# Patient Record
Sex: MALE | Race: WHITE | NOT HISPANIC OR LATINO | Employment: OTHER | URBAN - METROPOLITAN AREA
[De-identification: names, ages, dates, MRNs, and addresses within clinical notes are randomized per-mention and may not be internally consistent; named-entity substitution may affect disease eponyms.]

---

## 2018-03-13 ENCOUNTER — OFFICE VISIT CONVERTED (OUTPATIENT)
Dept: FAMILY MEDICINE CLINIC | Facility: CLINIC | Age: 58
End: 2018-03-13
Attending: NURSE PRACTITIONER

## 2018-03-13 ENCOUNTER — CONVERSION ENCOUNTER (OUTPATIENT)
Dept: FAMILY MEDICINE CLINIC | Facility: CLINIC | Age: 58
End: 2018-03-13

## 2018-03-26 ENCOUNTER — OFFICE VISIT CONVERTED (OUTPATIENT)
Dept: FAMILY MEDICINE CLINIC | Facility: CLINIC | Age: 58
End: 2018-03-26
Attending: NURSE PRACTITIONER

## 2018-10-11 ENCOUNTER — OFFICE VISIT CONVERTED (OUTPATIENT)
Dept: CARDIOLOGY | Facility: CLINIC | Age: 58
End: 2018-10-11
Attending: NURSE PRACTITIONER

## 2019-10-10 ENCOUNTER — HOSPITAL ENCOUNTER (OUTPATIENT)
Dept: OTHER | Facility: HOSPITAL | Age: 59
Discharge: HOME | End: 2019-10-10
Attending: INTERNAL MEDICINE

## 2019-10-10 LAB
ALBUMIN SERPL-MCNC: 4.7 G/DL (ref 3.5–5)
ALBUMIN/GLOB SERPL: 1.5 {RATIO} (ref 1.4–2.6)
ALP SERPL-CCNC: 79 U/L (ref 56–119)
ALT SERPL-CCNC: 11 U/L (ref 10–40)
ANION GAP SERPL CALC-SCNC: 19 MMOL/L (ref 8–19)
AST SERPL-CCNC: 21 U/L (ref 15–50)
BILIRUB SERPL-MCNC: 0.34 MG/DL (ref 0.2–1.3)
BUN SERPL-MCNC: 17 MG/DL (ref 5–25)
BUN/CREAT SERPL: 13 {RATIO} (ref 6–20)
CALCIUM SERPL-MCNC: 9.8 MG/DL (ref 8.7–10.4)
CHLORIDE SERPL-SCNC: 100 MMOL/L (ref 99–111)
CHOLEST SERPL-MCNC: 186 MG/DL (ref 107–200)
CHOLEST/HDLC SERPL: 3.1 {RATIO} (ref 3–6)
CONV CO2: 26 MMOL/L (ref 22–32)
CONV TOTAL PROTEIN: 7.9 G/DL (ref 6.3–8.2)
CREAT UR-MCNC: 1.28 MG/DL (ref 0.7–1.2)
GFR SERPLBLD BASED ON 1.73 SQ M-ARVRAT: >60 ML/MIN/{1.73_M2}
GLOBULIN UR ELPH-MCNC: 3.2 G/DL (ref 2–3.5)
GLUCOSE SERPL-MCNC: 97 MG/DL (ref 70–99)
HDLC SERPL-MCNC: 60 MG/DL (ref 40–60)
LDLC SERPL CALC-MCNC: 114 MG/DL (ref 70–100)
OSMOLALITY SERPL CALC.SUM OF ELEC: 291 MOSM/KG (ref 273–304)
POTASSIUM SERPL-SCNC: 5.1 MMOL/L (ref 3.5–5.3)
SODIUM SERPL-SCNC: 140 MMOL/L (ref 135–147)
TRIGL SERPL-MCNC: 60 MG/DL (ref 40–150)
VLDLC SERPL-MCNC: 12 MG/DL (ref 5–37)

## 2019-10-14 ENCOUNTER — OFFICE VISIT CONVERTED (OUTPATIENT)
Dept: CARDIOLOGY | Facility: CLINIC | Age: 59
End: 2019-10-14
Attending: INTERNAL MEDICINE

## 2020-02-14 ENCOUNTER — HOSPITAL ENCOUNTER (OUTPATIENT)
Dept: OTHER | Facility: HOSPITAL | Age: 60
Discharge: HOME | End: 2020-02-14
Attending: INTERNAL MEDICINE

## 2020-02-14 LAB
ANION GAP SERPL CALC-SCNC: 17 MMOL/L (ref 8–19)
BUN SERPL-MCNC: 19 MG/DL (ref 5–25)
BUN/CREAT SERPL: 17 {RATIO} (ref 6–20)
CALCIUM SERPL-MCNC: 9.6 MG/DL (ref 8.7–10.4)
CHLORIDE SERPL-SCNC: 99 MMOL/L (ref 99–111)
CONV CO2: 27 MMOL/L (ref 22–32)
CREAT UR-MCNC: 1.14 MG/DL (ref 0.7–1.2)
GFR SERPLBLD BASED ON 1.73 SQ M-ARVRAT: >60 ML/MIN/{1.73_M2}
GLUCOSE SERPL-MCNC: 92 MG/DL (ref 70–99)
OSMOLALITY SERPL CALC.SUM OF ELEC: 288 MOSM/KG (ref 273–304)
POTASSIUM SERPL-SCNC: 4.7 MMOL/L (ref 3.5–5.3)
SODIUM SERPL-SCNC: 138 MMOL/L (ref 135–147)

## 2020-07-13 ENCOUNTER — HOSPITAL ENCOUNTER (OUTPATIENT)
Dept: OTHER | Facility: HOSPITAL | Age: 60
Discharge: HOME | End: 2020-07-13
Attending: INTERNAL MEDICINE

## 2020-07-13 LAB
ALBUMIN SERPL-MCNC: 4.3 G/DL (ref 3.5–5)
ALBUMIN/GLOB SERPL: 1.6 {RATIO} (ref 1.4–2.6)
ALP SERPL-CCNC: 79 U/L (ref 56–119)
ALT SERPL-CCNC: 12 U/L (ref 10–40)
ANION GAP SERPL CALC-SCNC: 15 MMOL/L (ref 8–19)
AST SERPL-CCNC: 20 U/L (ref 15–50)
BILIRUB SERPL-MCNC: 0.24 MG/DL (ref 0.2–1.3)
BUN SERPL-MCNC: 23 MG/DL (ref 5–25)
BUN/CREAT SERPL: 16 {RATIO} (ref 6–20)
CALCIUM SERPL-MCNC: 9.2 MG/DL (ref 8.7–10.4)
CHLORIDE SERPL-SCNC: 107 MMOL/L (ref 99–111)
CHOLEST SERPL-MCNC: 175 MG/DL (ref 107–200)
CHOLEST/HDLC SERPL: 3.1 {RATIO} (ref 3–6)
CONV CO2: 23 MMOL/L (ref 22–32)
CONV TOTAL PROTEIN: 7 G/DL (ref 6.3–8.2)
CREAT UR-MCNC: 1.44 MG/DL (ref 0.7–1.2)
GFR SERPLBLD BASED ON 1.73 SQ M-ARVRAT: 53 ML/MIN/{1.73_M2}
GLOBULIN UR ELPH-MCNC: 2.7 G/DL (ref 2–3.5)
GLUCOSE SERPL-MCNC: 93 MG/DL (ref 70–99)
HDLC SERPL-MCNC: 56 MG/DL (ref 40–60)
LDLC SERPL CALC-MCNC: 110 MG/DL (ref 70–100)
OSMOLALITY SERPL CALC.SUM OF ELEC: 293 MOSM/KG (ref 273–304)
POTASSIUM SERPL-SCNC: 5.2 MMOL/L (ref 3.5–5.3)
SODIUM SERPL-SCNC: 140 MMOL/L (ref 135–147)
T4 FREE SERPL-MCNC: 1.3 NG/DL (ref 0.9–1.8)
TRIGL SERPL-MCNC: 43 MG/DL (ref 40–150)
TSH SERPL-ACNC: 2.11 M[IU]/L (ref 0.27–4.2)
VLDLC SERPL-MCNC: 9 MG/DL (ref 5–37)

## 2020-07-15 ENCOUNTER — OFFICE VISIT CONVERTED (OUTPATIENT)
Dept: CARDIOLOGY | Facility: CLINIC | Age: 60
End: 2020-07-15
Attending: INTERNAL MEDICINE

## 2020-11-30 ENCOUNTER — TELEMEDICINE CONVERTED (OUTPATIENT)
Dept: FAMILY MEDICINE CLINIC | Facility: CLINIC | Age: 60
End: 2020-11-30
Attending: NURSE PRACTITIONER

## 2020-12-01 ENCOUNTER — HOSPITAL ENCOUNTER (OUTPATIENT)
Dept: FAMILY MEDICINE CLINIC | Facility: CLINIC | Age: 60
Discharge: HOME | End: 2020-12-01
Attending: NURSE PRACTITIONER

## 2020-12-03 LAB — SARS-COV-2 RNA SPEC QL NAA+PROBE: DETECTED

## 2021-01-27 ENCOUNTER — HOSPITAL ENCOUNTER (OUTPATIENT)
Dept: GENERAL RADIOLOGY | Facility: HOSPITAL | Age: 61
Discharge: HOME | End: 2021-01-27
Attending: INTERNAL MEDICINE

## 2021-01-27 LAB
ALBUMIN SERPL-MCNC: 4.5 G/DL (ref 3.5–5)
ALBUMIN/GLOB SERPL: 1.4 {RATIO} (ref 1.4–2.6)
ALP SERPL-CCNC: 91 U/L (ref 56–119)
ALT SERPL-CCNC: 18 U/L (ref 10–40)
ANION GAP SERPL CALC-SCNC: 19 MMOL/L (ref 8–19)
AST SERPL-CCNC: 25 U/L (ref 15–50)
BILIRUB SERPL-MCNC: 0.46 MG/DL (ref 0.2–1.3)
BUN SERPL-MCNC: 18 MG/DL (ref 5–25)
BUN/CREAT SERPL: 13 {RATIO} (ref 6–20)
CALCIUM SERPL-MCNC: 9.5 MG/DL (ref 8.7–10.4)
CHLORIDE SERPL-SCNC: 101 MMOL/L (ref 99–111)
CHOLEST SERPL-MCNC: 180 MG/DL (ref 107–200)
CHOLEST/HDLC SERPL: 2.8 {RATIO} (ref 3–6)
CONV CO2: 25 MMOL/L (ref 22–32)
CONV TOTAL PROTEIN: 7.8 G/DL (ref 6.3–8.2)
CREAT UR-MCNC: 1.34 MG/DL (ref 0.7–1.2)
GFR SERPLBLD BASED ON 1.73 SQ M-ARVRAT: 58 ML/MIN/{1.73_M2}
GLOBULIN UR ELPH-MCNC: 3.3 G/DL (ref 2–3.5)
GLUCOSE SERPL-MCNC: 91 MG/DL (ref 70–99)
HDLC SERPL-MCNC: 65 MG/DL (ref 40–60)
LDLC SERPL CALC-MCNC: 106 MG/DL (ref 70–100)
OSMOLALITY SERPL CALC.SUM OF ELEC: 291 MOSM/KG (ref 273–304)
POTASSIUM SERPL-SCNC: 4.6 MMOL/L (ref 3.5–5.3)
SODIUM SERPL-SCNC: 140 MMOL/L (ref 135–147)
TRIGL SERPL-MCNC: 45 MG/DL (ref 40–150)
VLDLC SERPL-MCNC: 9 MG/DL (ref 5–37)

## 2021-05-13 NOTE — PROGRESS NOTES
"   Progress Note      Patient Name: Yoandy Rush   Patient ID: [id number]   Sex: Male   YOB: 1960    Primary Care Provider: Lise EWING   Referring Provider: Lise EWING    Visit Date: November 30, 2020    Provider: KAYLIN Quintanilla   Location: [facility]   Location Address: [address] [place] [address]  [id number]   Location Phone: [phone]          Chief Complaint     Headache and body aches  Weak and sore       History Of Present Illness  TELEHEALTH TELEPHONE VISIT  Yoandy Rush is a 60 year old /White male who is presenting for evaluation via telehealth telephone visit. Verbal consent obtained before beginning visit.   Provider spent 11:36 minutes with patient during telehealth visit.   The following staff were present during this visit: pt, EMMANUEL, DEMARCUS Valdez LPN, brother Cliff   Past Medical History/Overview of Patient Symptoms  Yoandy Rush is a 60 year old /White male who presents for evaluation and treatment of:      He has been out on the tractor without any cover.  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  He has been around his brother's with covid.  He has had s/s started about 2-3 days ago.  No fever that noted.  NO coughing.  He feels warm to touch but \"I think I may have the flu\".  No SOA noted.  He said the EMS took Moe to ER.  He doesn't have a ride today.       Past Medical History  Disease Name Date Onset Notes   Arthritis --  --    Heart Disease --  --    Hemorrhoids --  --    Hypertension --  --    Rectal bleeding --  --    Renal Failure 05/29/14 Was seen in ER Dehydration         Past Surgical History  Procedure Name Date Notes   Colonoscopy 2015 --          Medication List  Name Date Started Instructions   lisinopril-hydrochlorothiazide 20-12.5 mg oral tablet 08/20/2020 ONE PO QD   metoprolol succinate 25 mg oral tablet extended release 24 hr 08/24/2020 TAKE ONE TABLET BY MOUTH TWICE DAILY "         Allergy List  Allergen Name Date Reaction Notes   NO KNOWN DRUG ALLERGIES --  --  --          Family Medical History  Disease Name Relative/Age Notes   Chronic Obstructive Pulmonary Disease Mother/   --    Heart Disease  --    Hyperlipidemia  --    Congestive Heart Failure Father/   --    Hypertension Mother/   --    - No Family History of Colorectal Cancer  --          Social History  Finding Status Start/Stop Quantity Notes   Agricultural worker --  --/-- --  farming   Alcohol Current some day --/-- 1-2 somedays 08/03/2017 - Beer   Caffeine Current - status unknown --/-- --  drinks coffee and soft drinks; 1-2 times per day   Other Substance Use Never --/-- --  --    Second hand smoke exposure Current some day --/-- --  yes   Tobacco Former [age] --  08/03/2017 - started smoking at age 22; quit smoking at age 32; smoked 10 cigarette(s) per day  use to as a child          Review of Systems  · Constitutional  o Admits  o : fatigue  o Denies  o : fever, weight loss, weight gain  · HENT  o Admits  o : headaches  o Denies  o : nasal congestion, postnasal drip, sore throat  · Cardiovascular  o Denies  o : lower extremity edema, claudication, chest pressure, palpitations  · Respiratory  o Denies  o : shortness of breath, wheezing, cough, hemoptysis, dyspnea on exertion  · Gastrointestinal  o Denies  o : nausea, vomiting, diarrhea, constipation, abdominal pain          Assessment  · Headache     784.0/R51  · Exposure to COVID-19 virus     V01.79/Z20.828  · Body aches     780.96/R52      Plan  · Orders  o [facility] NCOV2 (send-out) (23153) - V01.79/Z20.828 - 11/30/2020  o ACO-39: Current medications updated and reviewed (, 1159F) - - 11/30/2020  o Covid Testing at Non[facility] facility (46096) - - 11/30/2020  o Flu Screen A/B (76995) - - 11/30/2020  · Instructions  o Plan Of Care:   o Take all medications as prescribed/directed.  o Call the office with any concerns or questions.  o OTC Tylenol/IBU for the  aches. Drink plenty of fluids. To the ER--SOA and the ER.   o He will come tomorrow to get swabbed due to not being able to drive. Drink plenty of fluids. Call with concerns or questions  · Disposition  o Call or Return if symptoms worsen or persist.            Electronically Signed by: KAYLIN Quintanilla -Author on November 30, 2020 11:49:37 AM

## 2021-05-13 NOTE — PROGRESS NOTES
"   Progress Note      Patient Name: Yoandy Rush   Patient ID: [id number]   Sex: Male   YOB: 1960    Primary Care Provider: Lise EWING   Referring Provider: Lise EWING    Visit Date: July 15, 2020    Provider: Reji Mccloud MD   Location: [facility]   Location Address: [address] [place] [address], [facility]   [place]  [id number]   Location Phone: [phone]          Chief Complaint  · Hypertension   · Chronic kidney disease   · Mitral valve prolapse       History Of Present Illness  REFERRING PROVIDER: Lise EWING   Yoandy Rush is a 60-year-old gentleman with hypertension, chronic kidney disease, and mitral valve prolapse who is doing well. He denies chest pain, shortness of breath, PND or orthopnea, palpitations, dizziness, or syncope. He has not kept a log of his blood pressure lately but whenever he has checked them they have been normal according to him.   PAST MEDICAL HISTORY: Hypertension; mitral valve regurgitation; chronic kidney disease, stage 3.   FAMILY HISTORY: Positive for hypertension. Negative for diabetes and heart disease.   PSYCHOSOCIAL HISTORY: No history of mood change or depression. He rarely drinks alcohol. He does not use tobacco.   CURRENT MEDICATIONS: include Metoprolol ER 25 mg b.i.d.; Lisinopril/HCTZ 20/25 mg daily. The dosage and frequency of the medications were reviewed with the patient.       Review of Systems  · Cardiovascular  o Denies  o : palpitations (fast, fluttering, or skipping beats), swelling (feet, ankles, hands), shortness of breath while walking or lying flat, chest pain or angina pectoris   · Respiratory  o Denies  o : chronic or frequent cough, asthma or wheezing      Vitals  Date Time BP Position Site L\R Cuff Size HR RR TEMP (F) WT  HT  BMI kg/m2 BSA m2 O2 Sat HC       07/15/2020 08:40 /66 Sitting    52 - R   136lbs 0oz 5'  6\" 21.95 1.69           Physical " Examination  · Constitutional  o Appearance  o : Awake, alert, in no acute distress.  · Respiratory  o Respiratory  o : Clear to percussion and auscultation. Good respiratory effort.  · Cardiovascular  o Heart  o : PMI is not well felt. S2 is regular. No S3. No S4. 1-2/6 systolic murmur at the apex. Negative diastolic murmur.  o Peripheral Vascular System  o :   § Extremities  § : Good femoral and pedal pulses. No pedal edema.  · Gastrointestinal  o Abdominal Examination  o : Soft. No masses or tenderness felt. No hepatosplenomegaly. Abdominal aorta is not palpable.  · Labs  o Labs  o : Chemistry panel normal except for BUN 23, creatinine 1.44, GFR 53, normal electrolytes, HDL 56, , triglyceride 143. His LDL has gone up a little bit. Thyroid function was normal.          Assessment     1.  Hypertension, uncertain control.  He thinks it is good at home.   He will document it and bring it to us.   2.  Chronic kidney disease, stage3, stable.   3.  Mitral valve prolapse with mitral valve regurgitation, stable.   4.  Lipids not quite at goa but still dietary control would be optimal option.       Plan     1.  Discussed with him a proper diet. He eats only one meal a day which is not good for him.   2.  Home blood pressure monitoring for two weeks, and bring us the log now as well as two weeks before his        next appointment.   3.  Follow up in six months.      Reji Mccloud MD, [facility]  PM/pap    This note was transcribed by Wendi Tomas.  pap/pm  The above service was transcribed by Wendi Tomas, and I attest to the accuracy of the note.  PM             Electronically Signed by: Priya Tomas-, Other -Author on July 20, 2020 01:50:54 PM  Electronically Co-signed by: Reji Mccloud MD -Reviewer on July 29, 2020 04:05:27 PM

## 2021-05-15 VITALS
SYSTOLIC BLOOD PRESSURE: 146 MMHG | HEART RATE: 52 BPM | BODY MASS INDEX: 21.86 KG/M2 | WEIGHT: 136 LBS | DIASTOLIC BLOOD PRESSURE: 66 MMHG | HEIGHT: 66 IN

## 2021-05-15 VITALS
SYSTOLIC BLOOD PRESSURE: 140 MMHG | DIASTOLIC BLOOD PRESSURE: 76 MMHG | WEIGHT: 134 LBS | BODY MASS INDEX: 21.53 KG/M2 | HEIGHT: 66 IN | HEART RATE: 54 BPM

## 2021-05-16 VITALS
OXYGEN SATURATION: 100 % | HEIGHT: 67 IN | SYSTOLIC BLOOD PRESSURE: 138 MMHG | WEIGHT: 134.5 LBS | TEMPERATURE: 98.6 F | RESPIRATION RATE: 12 BRPM | HEART RATE: 59 BPM | DIASTOLIC BLOOD PRESSURE: 70 MMHG | BODY MASS INDEX: 21.11 KG/M2

## 2021-05-16 VITALS
TEMPERATURE: 98.6 F | DIASTOLIC BLOOD PRESSURE: 64 MMHG | WEIGHT: 134.25 LBS | HEIGHT: 67 IN | BODY MASS INDEX: 21.07 KG/M2 | HEART RATE: 61 BPM | RESPIRATION RATE: 12 BRPM | SYSTOLIC BLOOD PRESSURE: 138 MMHG | OXYGEN SATURATION: 100 %

## 2021-05-16 VITALS
DIASTOLIC BLOOD PRESSURE: 80 MMHG | HEART RATE: 56 BPM | SYSTOLIC BLOOD PRESSURE: 140 MMHG | HEIGHT: 68 IN | BODY MASS INDEX: 19.85 KG/M2 | WEIGHT: 131 LBS

## 2021-05-22 ENCOUNTER — TRANSCRIBE ORDERS (OUTPATIENT)
Dept: CARDIOLOGY | Facility: CLINIC | Age: 61
End: 2021-05-22

## 2021-05-22 DIAGNOSIS — I34.1 MVP (MITRAL VALVE PROLAPSE): Primary | ICD-10-CM

## 2021-08-24 RX ORDER — LISINOPRIL AND HYDROCHLOROTHIAZIDE 20; 12.5 MG/1; MG/1
TABLET ORAL
Qty: 30 TABLET | Refills: 1 | Status: SHIPPED | OUTPATIENT
Start: 2021-08-24 | End: 2021-10-11 | Stop reason: SDUPTHER

## 2021-08-24 NOTE — TELEPHONE ENCOUNTER
Patient was seen 7/15/2020. Medication was decreased 8/20/2020 due to bp log. Next OV 10/11/2021.

## 2021-10-10 PROBLEM — I10 HYPERTENSION: Status: ACTIVE | Noted: 2021-10-10

## 2021-10-10 PROBLEM — N18.31 STAGE 3A CHRONIC KIDNEY DISEASE: Chronic | Status: ACTIVE | Noted: 2021-10-10

## 2021-10-10 PROBLEM — I10 HYPERTENSION: Chronic | Status: ACTIVE | Noted: 2021-10-10

## 2021-10-10 PROBLEM — I34.1 MILD MITRAL VALVE PROLAPSE: Status: ACTIVE | Noted: 2021-10-10

## 2021-10-11 ENCOUNTER — OFFICE VISIT (OUTPATIENT)
Dept: CARDIOLOGY | Facility: CLINIC | Age: 61
End: 2021-10-11

## 2021-10-11 VITALS
WEIGHT: 134 LBS | DIASTOLIC BLOOD PRESSURE: 76 MMHG | BODY MASS INDEX: 21.53 KG/M2 | SYSTOLIC BLOOD PRESSURE: 134 MMHG | HEIGHT: 66 IN | HEART RATE: 48 BPM

## 2021-10-11 DIAGNOSIS — N18.31 STAGE 3A CHRONIC KIDNEY DISEASE (HCC): Chronic | ICD-10-CM

## 2021-10-11 DIAGNOSIS — I10 PRIMARY HYPERTENSION: Chronic | ICD-10-CM

## 2021-10-11 DIAGNOSIS — I34.1 MILD MITRAL VALVE PROLAPSE: Primary | Chronic | ICD-10-CM

## 2021-10-11 PROCEDURE — 99214 OFFICE O/P EST MOD 30 MIN: CPT | Performed by: INTERNAL MEDICINE

## 2021-10-11 RX ORDER — LISINOPRIL AND HYDROCHLOROTHIAZIDE 20; 12.5 MG/1; MG/1
1 TABLET ORAL DAILY
Qty: 90 TABLET | Refills: 3 | Status: SHIPPED | OUTPATIENT
Start: 2021-10-11 | End: 2022-07-11 | Stop reason: SDUPTHER

## 2021-10-11 RX ORDER — METOPROLOL SUCCINATE 25 MG/1
25 TABLET, EXTENDED RELEASE ORAL DAILY
COMMUNITY
End: 2021-10-11 | Stop reason: SDUPTHER

## 2021-10-11 RX ORDER — METOPROLOL SUCCINATE 25 MG/1
25 TABLET, EXTENDED RELEASE ORAL 2 TIMES DAILY
Qty: 180 TABLET | Refills: 3 | Status: SHIPPED | OUTPATIENT
Start: 2021-10-11 | End: 2022-07-11 | Stop reason: SDUPTHER

## 2021-10-11 NOTE — ASSESSMENT & PLAN NOTE
Renal condition has not been recently evaluated.  Patient had been asked to get labs done prior to his appointment today but forgot.  We will get them done in the next few days.   hypokalemia hypokalemia hypokalemia

## 2021-10-11 NOTE — PROGRESS NOTES
"Chief Complaint  Follow-up, Hypertension, and Chronic Kidney Disease    Subjective            Yoandy Rush presents to [facility]  Mr. Fitzgerald is a 61 years old gentleman with hypertension chronic kidney disease mitral valve prolapse who is done well.  He denies chest pain palpitation shortness of breath dizziness syncope his blood pressure is well controlled      Past Medical History:   Diagnosis Date   • Hypertension 10/10/2021   • Mild mitral valve prolapse 10/7/2015    With mild mitral valve regurgitation on echo 2015   • Stage 3a chronic kidney disease (HCC) 10/10/2021       No Known Allergies     History reviewed. No pertinent surgical history.     Social History     Tobacco Use   • Smoking status: Former Smoker   • Smokeless tobacco: Former User   Vaping Use   • Vaping Use: Never used   Substance Use Topics   • Alcohol use: Yes   • Drug use: Never       Family History   Family history unknown: Yes        Prior to Admission medications    Medication Sig Start Date End Date Taking? Authorizing Provider   lisinopril-hydrochlorothiazide (PRINZIDE,ZESTORETIC) 20-12.5 MG per tablet TAKE ONE TABLET BY MOUTH ONCE DAILY  Patient taking differently: Take 1 tablet by mouth Daily. 8/24/21  Yes Reji Mccloud MD   metoprolol succinate XL (TOPROL-XL) 25 MG 24 hr tablet Take 25 mg by mouth Daily.   Yes Provider, MD Chris        Review of Systems   Constitutional: Negative for fatigue.   Respiratory: Negative for cough and shortness of breath.    Cardiovascular: Negative for chest pain, palpitations and leg swelling.   Neurological: Negative for dizziness.        Objective     /76 (BP Location: Left arm, Patient Position: Sitting, Cuff Size: Adult)   Pulse (!) 48   Ht 167.6 cm (66\")   Wt 60.8 kg (134 lb)   BMI 21.63 kg/m²       Physical Exam  Constitutional:       General: He is awake.      Appearance: Normal appearance.   Neck:      Thyroid: No thyromegaly.      Vascular: No " carotid bruit or JVD.   Cardiovascular:      Rate and Rhythm: Normal rate and regular rhythm.      Chest Wall: PMI is not displaced.      Pulses: Normal pulses.      Heart sounds: Normal heart sounds, S1 normal and S2 normal. No murmur heard.  No friction rub. No gallop. No S3 or S4 sounds.    Pulmonary:      Effort: Pulmonary effort is normal.      Breath sounds: Normal breath sounds and air entry. No wheezing, rhonchi or rales.   Abdominal:      General: Bowel sounds are normal.      Palpations: Abdomen is soft. There is no mass.      Tenderness: There is no abdominal tenderness.   Musculoskeletal:      Cervical back: Neck supple.      Right lower leg: No edema.      Left lower leg: No edema.   Neurological:      Mental Status: He is alert and oriented to person, place, and time.   Psychiatric:         Mood and Affect: Mood normal.         Behavior: Behavior is cooperative.       No results found for: PROBNP, BNP  CMP    CMP 1/27/21   Glucose 91   BUN 18   Creatinine 1.34 (A)   Sodium 140   Potassium 4.6   Chloride 101   Calcium 9.5   Albumin 4.5   Total Bilirubin 0.46   Alkaline Phosphatase 91   AST (SGOT) 25   ALT (SGPT) 18   (A) Abnormal value               Lipid Panel    Lipid Panel 1/27/21   Total Cholesterol 180   Triglycerides 45   HDL Cholesterol 65 (A)   VLDL Cholesterol 9   LDL Cholesterol  106 (A)   (A) Abnormal value       Comments are available for some flowsheets but are not being displayed.            Lab Results   Component Value Date    TSH 2.110 07/13/2020      Lab Results   Component Value Date    FREET4 1.3 07/13/2020      No results found for: DDIMERQUANT  No results found for: MG   No results found for: DIGOXIN        Results for orders placed in visit on 10/11/21    Adult Transthoracic Echo Complete w/ Color, Spectral and Contrast if necessary per protocol    Interpretation Summary  · Estimated left ventricular EF was in agreement with the calculated left ventricular EF. Left ventricular  ejection fraction appears to be 56 - 60%. Left ventricular systolic function is normal.  · Left ventricular diastolic function was normal.  · The right ventricular cavity is borderline dilated.  · There is mild mitral valve prolapse of the posterior mitral leaflet.  · Estimated right ventricular systolic pressure from tricuspid regurgitation is normal (<35 mmHg).        Result Review :                           Assessment and Plan        Diagnoses and all orders for this visit:    1. Mild mitral valve prolapse (Primary)  -     CBC & Differential; Future  -     Comprehensive Metabolic Panel; Future  -     Lipid Panel; Future  -     Lipid Panel; Future  -     Comprehensive Metabolic Panel; Future  -     Magnesium; Future  -     TSH; Future    2. Primary hypertension  Assessment & Plan:  Hypertension is very well controlled on the current medications    Orders:  -     CBC & Differential; Future  -     Comprehensive Metabolic Panel; Future  -     Lipid Panel; Future  -     Lipid Panel; Future  -     Comprehensive Metabolic Panel; Future  -     Magnesium; Future  -     TSH; Future    3. Stage 3a chronic kidney disease (HCC)  Assessment & Plan:  Renal condition has not been recently evaluated.  Patient had been asked to get labs done prior to his appointment today but forgot.  We will get them done in the next few days.    Orders:  -     CBC & Differential; Future  -     Comprehensive Metabolic Panel; Future  -     Lipid Panel; Future  -     Lipid Panel; Future  -     Comprehensive Metabolic Panel; Future  -     Magnesium; Future  -     TSH; Future    Other orders  -     metoprolol succinate XL (TOPROL-XL) 25 MG 24 hr tablet; Take 1 tablet by mouth 2 (two) times a day.  Dispense: 180 tablet; Refill: 3  -     lisinopril-hydrochlorothiazide (PRINZIDE,ZESTORETIC) 20-12.5 MG per tablet; Take 1 tablet by mouth Daily.  Dispense: 90 tablet; Refill: 3          Follow Up     Return in about 9 months (around 7/11/2022) for With  June.    Patient was given instructions and counseling regarding his condition or for health maintenance advice. Please see specific information pulled into the AVS if appropriate.

## 2021-10-14 ENCOUNTER — LAB (OUTPATIENT)
Dept: LAB | Facility: HOSPITAL | Age: 61
End: 2021-10-14

## 2021-10-14 DIAGNOSIS — N18.31 STAGE 3A CHRONIC KIDNEY DISEASE (HCC): ICD-10-CM

## 2021-10-14 DIAGNOSIS — I34.1 MILD MITRAL VALVE PROLAPSE: ICD-10-CM

## 2021-10-14 DIAGNOSIS — I10 PRIMARY HYPERTENSION: ICD-10-CM

## 2021-10-14 LAB
ALBUMIN SERPL-MCNC: 4.7 G/DL (ref 3.5–5.2)
ALBUMIN/GLOB SERPL: 1.6 G/DL
ALP SERPL-CCNC: 79 U/L (ref 39–117)
ALT SERPL W P-5'-P-CCNC: 15 U/L (ref 1–41)
ANION GAP SERPL CALCULATED.3IONS-SCNC: 9.1 MMOL/L (ref 5–15)
AST SERPL-CCNC: 19 U/L (ref 1–40)
BASOPHILS # BLD AUTO: 0.07 10*3/MM3 (ref 0–0.2)
BASOPHILS NFR BLD AUTO: 1.1 % (ref 0–1.5)
BILIRUB SERPL-MCNC: 0.5 MG/DL (ref 0–1.2)
BUN SERPL-MCNC: 18 MG/DL (ref 6–20)
BUN/CREAT SERPL: 15.5 (ref 7–25)
CALCIUM SPEC-SCNC: 9.6 MG/DL (ref 8.6–10.5)
CHLORIDE SERPL-SCNC: 103 MMOL/L (ref 98–107)
CHOLEST SERPL-MCNC: 177 MG/DL (ref 0–200)
CO2 SERPL-SCNC: 28.9 MMOL/L (ref 22–29)
CREAT SERPL-MCNC: 1.16 MG/DL (ref 0.76–1.27)
DEPRECATED RDW RBC AUTO: 39.2 FL (ref 37–54)
EOSINOPHIL # BLD AUTO: 0.39 10*3/MM3 (ref 0–0.4)
EOSINOPHIL NFR BLD AUTO: 6.3 % (ref 0.3–6.2)
ERYTHROCYTE [DISTWIDTH] IN BLOOD BY AUTOMATED COUNT: 12.2 % (ref 12.3–15.4)
GFR SERPL CREATININE-BSD FRML MDRD: 64 ML/MIN/1.73
GLOBULIN UR ELPH-MCNC: 3 GM/DL
GLUCOSE SERPL-MCNC: 86 MG/DL (ref 65–99)
HCT VFR BLD AUTO: 33.7 % (ref 37.5–51)
HDLC SERPL-MCNC: 65 MG/DL (ref 40–60)
HGB BLD-MCNC: 11.6 G/DL (ref 13–17.7)
IMM GRANULOCYTES # BLD AUTO: 0.02 10*3/MM3 (ref 0–0.05)
IMM GRANULOCYTES NFR BLD AUTO: 0.3 % (ref 0–0.5)
LDLC SERPL CALC-MCNC: 104 MG/DL (ref 0–100)
LDLC/HDLC SERPL: 1.61 {RATIO}
LYMPHOCYTES # BLD AUTO: 1.32 10*3/MM3 (ref 0.7–3.1)
LYMPHOCYTES NFR BLD AUTO: 21.4 % (ref 19.6–45.3)
MCH RBC QN AUTO: 30.7 PG (ref 26.6–33)
MCHC RBC AUTO-ENTMCNC: 34.4 G/DL (ref 31.5–35.7)
MCV RBC AUTO: 89.2 FL (ref 79–97)
MONOCYTES # BLD AUTO: 0.66 10*3/MM3 (ref 0.1–0.9)
MONOCYTES NFR BLD AUTO: 10.7 % (ref 5–12)
NEUTROPHILS NFR BLD AUTO: 3.71 10*3/MM3 (ref 1.7–7)
NEUTROPHILS NFR BLD AUTO: 60.2 % (ref 42.7–76)
NRBC BLD AUTO-RTO: 0 /100 WBC (ref 0–0.2)
PLATELET # BLD AUTO: 266 10*3/MM3 (ref 140–450)
PMV BLD AUTO: 10.5 FL (ref 6–12)
POTASSIUM SERPL-SCNC: 4.6 MMOL/L (ref 3.5–5.2)
PROT SERPL-MCNC: 7.7 G/DL (ref 6–8.5)
RBC # BLD AUTO: 3.78 10*6/MM3 (ref 4.14–5.8)
SODIUM SERPL-SCNC: 141 MMOL/L (ref 136–145)
TRIGL SERPL-MCNC: 37 MG/DL (ref 0–150)
VLDLC SERPL-MCNC: 8 MG/DL (ref 5–40)
WBC # BLD AUTO: 6.17 10*3/MM3 (ref 3.4–10.8)

## 2021-10-14 PROCEDURE — 85025 COMPLETE CBC W/AUTO DIFF WBC: CPT

## 2021-10-14 PROCEDURE — 80053 COMPREHEN METABOLIC PANEL: CPT

## 2021-10-14 PROCEDURE — 36415 COLL VENOUS BLD VENIPUNCTURE: CPT

## 2021-10-14 PROCEDURE — 80061 LIPID PANEL: CPT

## 2021-10-28 ENCOUNTER — TELEPHONE (OUTPATIENT)
Dept: CARDIOLOGY | Facility: CLINIC | Age: 61
End: 2021-10-28

## 2021-10-28 NOTE — TELEPHONE ENCOUNTER
----- Message from Reji Mccloud MD sent at 10/26/2021  9:34 AM EDT -----  Let pt. Know labs look good

## 2022-07-05 ENCOUNTER — LAB (OUTPATIENT)
Dept: LAB | Facility: HOSPITAL | Age: 62
End: 2022-07-05

## 2022-07-05 DIAGNOSIS — I10 PRIMARY HYPERTENSION: Chronic | ICD-10-CM

## 2022-07-05 DIAGNOSIS — N18.31 STAGE 3A CHRONIC KIDNEY DISEASE: Chronic | ICD-10-CM

## 2022-07-05 DIAGNOSIS — I34.1 MILD MITRAL VALVE PROLAPSE: Chronic | ICD-10-CM

## 2022-07-05 LAB
ALBUMIN SERPL-MCNC: 4.6 G/DL (ref 3.5–5.2)
ALBUMIN/GLOB SERPL: 1.7 G/DL
ALP SERPL-CCNC: 80 U/L (ref 39–117)
ALT SERPL W P-5'-P-CCNC: 12 U/L (ref 1–41)
ANION GAP SERPL CALCULATED.3IONS-SCNC: 9.8 MMOL/L (ref 5–15)
AST SERPL-CCNC: 18 U/L (ref 1–40)
BILIRUB SERPL-MCNC: 0.5 MG/DL (ref 0–1.2)
BUN SERPL-MCNC: 22 MG/DL (ref 8–23)
BUN/CREAT SERPL: 18.8 (ref 7–25)
CALCIUM SPEC-SCNC: 9.3 MG/DL (ref 8.6–10.5)
CHLORIDE SERPL-SCNC: 102 MMOL/L (ref 98–107)
CHOLEST SERPL-MCNC: 173 MG/DL (ref 0–200)
CO2 SERPL-SCNC: 26.2 MMOL/L (ref 22–29)
CREAT SERPL-MCNC: 1.17 MG/DL (ref 0.76–1.27)
EGFRCR SERPLBLD CKD-EPI 2021: 71.4 ML/MIN/1.73
GLOBULIN UR ELPH-MCNC: 2.7 GM/DL
GLUCOSE SERPL-MCNC: 82 MG/DL (ref 65–99)
HDLC SERPL-MCNC: 62 MG/DL (ref 40–60)
LDLC SERPL CALC-MCNC: 103 MG/DL (ref 0–100)
LDLC/HDLC SERPL: 1.66 {RATIO}
MAGNESIUM SERPL-MCNC: 1.9 MG/DL (ref 1.6–2.4)
POTASSIUM SERPL-SCNC: 4.8 MMOL/L (ref 3.5–5.2)
PROT SERPL-MCNC: 7.3 G/DL (ref 6–8.5)
SODIUM SERPL-SCNC: 138 MMOL/L (ref 136–145)
TRIGL SERPL-MCNC: 40 MG/DL (ref 0–150)
TSH SERPL DL<=0.05 MIU/L-ACNC: 1.6 UIU/ML (ref 0.27–4.2)
VLDLC SERPL-MCNC: 8 MG/DL (ref 5–40)

## 2022-07-05 PROCEDURE — 83735 ASSAY OF MAGNESIUM: CPT

## 2022-07-05 PROCEDURE — 84443 ASSAY THYROID STIM HORMONE: CPT

## 2022-07-05 PROCEDURE — 80053 COMPREHEN METABOLIC PANEL: CPT

## 2022-07-05 PROCEDURE — 36415 COLL VENOUS BLD VENIPUNCTURE: CPT

## 2022-07-05 PROCEDURE — 80061 LIPID PANEL: CPT

## 2022-07-07 NOTE — PROGRESS NOTES
Chief Complaint  Hypertension    Subjective        Yoandy Rush presents to [facility]  He is a 62-year-old white male comes in to evaluate his mitral valve regurgitation and hypertension. Denies any chest pains, shortness of breath, palpitations, dizziness, syncope, swelling, PND, or orthopnea.  Cardiac wise he has no complaints.  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       Past History:    Past Medical History:   Diagnosis Date   • Hypertension 10/10/2021   • Mild mitral valve prolapse 10/7/2015    With mild mitral valve regurgitation on echo 2015   • Stage 3a chronic kidney disease (HCC) 10/10/2021        Family History: family history includes Heart disease in his father; Stroke in his mother.     Social History: reports that he quit smoking about 10 years ago. He has a 2.50 pack-year smoking history. He has quit using smokeless tobacco. He reports current alcohol use. He reports that he does not use drugs.    Allergies: Patient has no known allergies.    History reviewed. No pertinent surgical history.       Current Outpatient Medications:   •  lisinopril-hydrochlorothiazide (PRINZIDE,ZESTORETIC) 20-12.5 MG per tablet, Take 1 tablet by mouth Daily., Disp: 90 tablet, Rfl: 3  •  metoprolol succinate XL (TOPROL-XL) 25 MG 24 hr tablet, Take 1 tablet by mouth Daily., Disp: 180 tablet, Rfl: 3     Medications Discontinued During This Encounter   Medication Reason   • metoprolol succinate XL (TOPROL-XL) 25 MG 24 hr tablet Reorder   • lisinopril-hydrochlorothiazide (PRINZIDE,ZESTORETIC) 20-12.5 MG per tablet Reorder        Review of Systems   Constitutional: Negative for fatigue.   Respiratory: Negative for cough and shortness of breath.    Cardiovascular: Negative for chest pain, palpitations and leg swelling.   Neurological: Negative for dizziness and light-headedness.   All other systems reviewed and are negative.       Objective  "    Physical Exam  Constitutional:       General: He is not in acute distress.     Appearance: Normal appearance. He is normal weight.      Comments: Accompanied by his brother.   Neck:      Vascular: No carotid bruit.   Cardiovascular:      Rate and Rhythm: Normal rate and regular rhythm.      Chest Wall: PMI is displaced.      Heart sounds: Murmur (1/6 to 2/6 systolic murmur at the apex) heard.   Pulmonary:      Effort: Pulmonary effort is normal.      Breath sounds: Normal breath sounds.   Musculoskeletal:      Right lower leg: No edema.      Left lower leg: No edema.   Neurological:      Mental Status: He is alert.       /80   Pulse 50   Ht 167.6 cm (66\")   Wt 62.1 kg (136 lb 12.8 oz)   BMI 22.08 kg/m²       Vitals:    07/11/22 0952   BP: 126/80   Pulse: 50       Result Review :         The following data was reviewed by: KAYLIN Jefferson on 07/11/2022:      CMP    CMP 10/14/21 7/5/22   Glucose 86 82   BUN 18 22   Creatinine 1.16 1.17   eGFR Non African Am 64    Sodium 141 138   Potassium 4.6 4.8   Chloride 103 102   Calcium 9.6 9.3   Albumin 4.70 4.60   Total Bilirubin 0.5 0.5   Alkaline Phosphatase 79 80   AST (SGOT) 19 18   ALT (SGPT) 15 12           CBC w/diff    CBC w/Diff 10/14/21   WBC 6.17   RBC 3.78 (A)   Hemoglobin 11.6 (A)   Hematocrit 33.7 (A)   MCV 89.2   MCH 30.7   MCHC 34.4   RDW 12.2 (A)   Platelets 266   Neutrophil Rel % 60.2   Immature Granulocyte Rel % 0.3   Lymphocyte Rel % 21.4   Monocyte Rel % 10.7   Eosinophil Rel % 6.3 (A)   Basophil Rel % 1.1   (A) Abnormal value             Lipid Panel    Lipid Panel 10/14/21 7/5/22   Total Cholesterol 177 173   Triglycerides 37 40   HDL Cholesterol 65 (A) 62 (A)   VLDL Cholesterol 8 8   LDL Cholesterol  104 (A) 103 (A)   LDL/HDL Ratio 1.61 1.66   (A) Abnormal value             Lab Results   Component Value Date    TSH 1.600 07/05/2022    TSH 2.110 07/13/2020      Lab Results   Component Value Date    FREET4 1.3 07/13/2020      Magnesium "   Date Value Ref Range Status   07/05/2022 1.9 1.6 - 2.4 mg/dL Final         Last Echo  Results for orders placed in visit on 10/11/21    Adult Transthoracic Echo Complete w/ Color, Spectral and Contrast if necessary per protocol    Interpretation Summary  · Estimated left ventricular EF was in agreement with the calculated left ventricular EF. Left ventricular ejection fraction appears to be 56 - 60%. Left ventricular systolic function is normal.  · Left ventricular diastolic function was normal.  · The right ventricular cavity is borderline dilated.  · There is mild mitral valve prolapse of the posterior mitral leaflet.  · Estimated right ventricular systolic pressure from tricuspid regurgitation is normal (<35 mmHg).             Assessment and Plan    Diagnoses and all orders for this visit:    1. Primary hypertension (Primary)  Assessment & Plan:  Controlled.  Continue metoprolol ER 25 mg and lisinopril HCT 20/12.5.    Orders:  -     metoprolol succinate XL (TOPROL-XL) 25 MG 24 hr tablet; Take 1 tablet by mouth Daily.  Dispense: 180 tablet; Refill: 3  -     lisinopril-hydrochlorothiazide (PRINZIDE,ZESTORETIC) 20-12.5 MG per tablet; Take 1 tablet by mouth Daily.  Dispense: 90 tablet; Refill: 3    2. Mild mitral valve prolapse  Assessment & Plan:  Without shortness of breath.  We will continue to monitor.            Follow Up     Return in about 9 months (around 4/11/2023) for with [facility] per request.    Patient was given instructions and counseling regarding his condition or for health maintenance advice. Please see specific information pulled into the AVS if appropriate.       KAYLIN Mcallister  07/11/22 07:58 EDT

## 2022-07-11 ENCOUNTER — OFFICE VISIT (OUTPATIENT)
Dept: CARDIOLOGY | Facility: CLINIC | Age: 62
End: 2022-07-11

## 2022-07-11 VITALS
HEIGHT: 66 IN | DIASTOLIC BLOOD PRESSURE: 80 MMHG | BODY MASS INDEX: 21.98 KG/M2 | WEIGHT: 136.8 LBS | SYSTOLIC BLOOD PRESSURE: 126 MMHG | HEART RATE: 50 BPM

## 2022-07-11 DIAGNOSIS — I34.1 MILD MITRAL VALVE PROLAPSE: Chronic | ICD-10-CM

## 2022-07-11 DIAGNOSIS — I10 PRIMARY HYPERTENSION: Primary | Chronic | ICD-10-CM

## 2022-07-11 PROCEDURE — 99214 OFFICE O/P EST MOD 30 MIN: CPT | Performed by: NURSE PRACTITIONER

## 2022-07-11 RX ORDER — METOPROLOL SUCCINATE 25 MG/1
25 TABLET, EXTENDED RELEASE ORAL DAILY
Qty: 180 TABLET | Refills: 3 | Status: SHIPPED | OUTPATIENT
Start: 2022-07-11

## 2022-07-11 RX ORDER — LISINOPRIL AND HYDROCHLOROTHIAZIDE 20; 12.5 MG/1; MG/1
1 TABLET ORAL DAILY
Qty: 90 TABLET | Refills: 3 | Status: SHIPPED | OUTPATIENT
Start: 2022-07-11

## 2023-04-17 ENCOUNTER — OFFICE VISIT (OUTPATIENT)
Dept: CARDIOLOGY | Facility: CLINIC | Age: 63
End: 2023-04-17
Payer: COMMERCIAL

## 2023-04-17 VITALS
SYSTOLIC BLOOD PRESSURE: 132 MMHG | HEIGHT: 66 IN | HEART RATE: 53 BPM | DIASTOLIC BLOOD PRESSURE: 40 MMHG | BODY MASS INDEX: 21.58 KG/M2 | WEIGHT: 134.3 LBS

## 2023-04-17 DIAGNOSIS — I10 PRIMARY HYPERTENSION: Chronic | ICD-10-CM

## 2023-04-17 DIAGNOSIS — I34.1 MILD MITRAL VALVE PROLAPSE: Primary | Chronic | ICD-10-CM

## 2023-04-17 PROCEDURE — 99214 OFFICE O/P EST MOD 30 MIN: CPT | Performed by: INTERNAL MEDICINE

## 2023-04-17 PROCEDURE — 3075F SYST BP GE 130 - 139MM HG: CPT | Performed by: INTERNAL MEDICINE

## 2023-04-17 PROCEDURE — 3078F DIAST BP <80 MM HG: CPT | Performed by: INTERNAL MEDICINE

## 2023-04-17 RX ORDER — METOPROLOL SUCCINATE 25 MG/1
25 TABLET, EXTENDED RELEASE ORAL DAILY
Qty: 180 TABLET | Refills: 3 | Status: SHIPPED | OUTPATIENT
Start: 2023-04-17 | End: 2023-04-19 | Stop reason: SDUPTHER

## 2023-04-17 RX ORDER — LISINOPRIL AND HYDROCHLOROTHIAZIDE 20; 12.5 MG/1; MG/1
1 TABLET ORAL DAILY
Qty: 90 TABLET | Refills: 3 | Status: SHIPPED | OUTPATIENT
Start: 2023-04-17

## 2023-04-17 NOTE — PROGRESS NOTES
"Chief Complaint  Hypertension, Mitral Valve Prolapse, and Follow-up    Subjective    Patient with no new complaints or problems stable breathing capacity no chest pain.  Blood pressure has been controlled  Past Medical History:   Diagnosis Date   • Hypertension 10/10/2021   • Mild mitral valve prolapse 10/7/2015    With mild mitral valve regurgitation on echo    • Stage 3a chronic kidney disease 10/10/2021         Current Outpatient Medications:   •  lisinopril-hydrochlorothiazide (PRINZIDE,ZESTORETIC) 20-12.5 MG per tablet, Take 1 tablet by mouth Daily., Disp: 90 tablet, Rfl: 3  •  metoprolol succinate XL (TOPROL-XL) 25 MG 24 hr tablet, Take 1 tablet by mouth Daily., Disp: 180 tablet, Rfl: 3    Medications Discontinued During This Encounter   Medication Reason   • metoprolol succinate XL (TOPROL-XL) 25 MG 24 hr tablet Reorder   • lisinopril-hydrochlorothiazide (PRINZIDE,ZESTORETIC) 20-12.5 MG per tablet Reorder     No Known Allergies     Social History     Tobacco Use   • Smoking status: Former     Packs/day: 0.50     Years: 5.00     Pack years: 2.50     Types: Cigarettes     Quit date:      Years since quittin.2   • Smokeless tobacco: Former   Vaping Use   • Vaping Use: Never used   Substance Use Topics   • Alcohol use: Yes     Comment: occ   • Drug use: Never       Family History   Problem Relation Age of Onset   • Stroke Mother    • Heart disease Father         Objective     /40   Pulse 53   Ht 167.6 cm (66\")   Wt 60.9 kg (134 lb 4.8 oz)   BMI 21.68 kg/m²       Physical Exam    General Appearance:   · no acute distress  · Alert and oriented x3  HENT:   · lips not cyanotic  · Atraumatic  Neck:  · No jvd   · supple  Respiratory:  · no respiratory distress  · normal breath sounds  · no rales  Cardiovascular:  · Regular rate and rhythm  · no S3, no S4   · no murmur  · no rub  Extremities  · No cyanosis  · lower extremity edema: none    Skin:   · warm, dry  · No rashes      Result Review : "     No results found for: PROBNP  CMP        7/5/2022    10:42   CMP   Glucose 82     BUN 22     Creatinine 1.17     EGFR 71.4     Sodium 138     Potassium 4.8     Chloride 102     Calcium 9.3     Total Protein 7.3     Albumin 4.60     Globulin 2.7     Total Bilirubin 0.5     Alkaline Phosphatase 80     AST (SGOT) 18     ALT (SGPT) 12     Albumin/Globulin Ratio 1.7     BUN/Creatinine Ratio 18.8     Anion Gap 9.8          Lab Results   Component Value Date    TSH 1.600 07/05/2022      Lab Results   Component Value Date    FREET4 1.3 07/13/2020      No results found for: DDIMERQUANT  Magnesium   Date Value Ref Range Status   07/05/2022 1.9 1.6 - 2.4 mg/dL Final      No results found for: DIGOXIN   No results found for: TROPONINT        Lipid Panel        7/5/2022    10:42   Lipid Panel   Total Cholesterol 173     Triglycerides 40     HDL Cholesterol 62     VLDL Cholesterol 8     LDL Cholesterol  103     LDL/HDL Ratio 1.66       No results found for: POCTROP    Results for orders placed in visit on 10/11/21    Adult Transthoracic Echo Complete w/ Color, Spectral and Contrast if necessary per protocol    Interpretation Summary  · Estimated left ventricular EF was in agreement with the calculated left ventricular EF. Left ventricular ejection fraction appears to be 56 - 60%. Left ventricular systolic function is normal.  · Left ventricular diastolic function was normal.  · The right ventricular cavity is borderline dilated.  · There is mild mitral valve prolapse of the posterior mitral leaflet.  · Estimated right ventricular systolic pressure from tricuspid regurgitation is normal (<35 mmHg).                 Diagnoses and all orders for this visit:    1. Mild mitral valve prolapse (Primary)    2. Primary hypertension  Assessment & Plan:  Continue with lisinopril hydrochlorothiazide 20/12.5 mg daily and Toprol 25 daily dosing    Orders:  -     lisinopril-hydrochlorothiazide (PRINZIDE,ZESTORETIC) 20-12.5 MG per tablet;  Take 1 tablet by mouth Daily.  Dispense: 90 tablet; Refill: 3  -     metoprolol succinate XL (TOPROL-XL) 25 MG 24 hr tablet; Take 1 tablet by mouth Daily.  Dispense: 180 tablet; Refill: 3  -     Basic Metabolic Panel; Future          Follow Up     Return in about 1 year (around 4/17/2024) for Follow with Tonja Hawk.          Patient was given instructions and counseling regarding his condition or for health maintenance advice. Please see specific information pulled into the AVS if appropriate.

## 2023-04-19 ENCOUNTER — TELEPHONE (OUTPATIENT)
Dept: CARDIOLOGY | Facility: CLINIC | Age: 63
End: 2023-04-19
Payer: COMMERCIAL

## 2023-04-19 DIAGNOSIS — I10 PRIMARY HYPERTENSION: Chronic | ICD-10-CM

## 2023-04-19 RX ORDER — METOPROLOL SUCCINATE 25 MG/1
25 TABLET, EXTENDED RELEASE ORAL DAILY
Qty: 90 TABLET | Refills: 3 | Status: SHIPPED | OUTPATIENT
Start: 2023-04-19

## 2023-04-19 NOTE — TELEPHONE ENCOUNTER
Pharmacy Name:  [facility]    Pharmacy representative name: CHAS    Pharmacy representative phone number: [phone]    What medication are you calling in regards to: METOPROLOL    What question does the pharmacy have: NEEDING CLARIFICATION ON QUANTITY    Who is the provider that prescribed the medication:

## 2023-08-11 ENCOUNTER — OFFICE VISIT (OUTPATIENT)
Dept: FAMILY MEDICINE CLINIC | Facility: CLINIC | Age: 63
End: 2023-08-11
Payer: COMMERCIAL

## 2023-08-11 VITALS
HEART RATE: 59 BPM | SYSTOLIC BLOOD PRESSURE: 131 MMHG | BODY MASS INDEX: 20.62 KG/M2 | WEIGHT: 128.3 LBS | OXYGEN SATURATION: 98 % | DIASTOLIC BLOOD PRESSURE: 67 MMHG | TEMPERATURE: 97.8 F | HEIGHT: 66 IN

## 2023-08-11 DIAGNOSIS — K21.9 GASTROESOPHAGEAL REFLUX DISEASE, UNSPECIFIED WHETHER ESOPHAGITIS PRESENT: ICD-10-CM

## 2023-08-11 DIAGNOSIS — R10.11 RIGHT UPPER QUADRANT PAIN: Primary | ICD-10-CM

## 2023-08-11 PROCEDURE — 1159F MED LIST DOCD IN RCRD: CPT | Performed by: NURSE PRACTITIONER

## 2023-08-11 PROCEDURE — 3075F SYST BP GE 130 - 139MM HG: CPT | Performed by: NURSE PRACTITIONER

## 2023-08-11 PROCEDURE — 1160F RVW MEDS BY RX/DR IN RCRD: CPT | Performed by: NURSE PRACTITIONER

## 2023-08-11 PROCEDURE — 3078F DIAST BP <80 MM HG: CPT | Performed by: NURSE PRACTITIONER

## 2023-08-11 PROCEDURE — 99213 OFFICE O/P EST LOW 20 MIN: CPT | Performed by: NURSE PRACTITIONER

## 2023-08-11 RX ORDER — OMEPRAZOLE 40 MG/1
40 CAPSULE, DELAYED RELEASE ORAL DAILY
Qty: 14 CAPSULE | Refills: 0 | Status: SHIPPED | OUTPATIENT
Start: 2023-08-11 | End: 2023-08-25

## 2023-08-11 NOTE — PROGRESS NOTES
"Chief Complaint  Back Pain and side pain (Right side/)    Subjective          Yoandy Rush, 63 y.o. male presents to [facility]  History of Present Illness   Patient presents today for an acute visit.  He is accompanied by his brother.  He is a patient KAYLIN Quintanilla. He is complaining of pain in the mid to right upper quadrant that radiates to his right back.  He states this has been going on for about 2 days.  He states that he took some Gas-X last night and that seemed to have relieved his symptoms.  He states it was worse when he was laying down.  He denies any known injuries.  He does still have his gallbladder.    PHQ-2 Depression Screening  Little interest or pleasure in doing things? 0-->not at all   Feeling down, depressed, or hopeless? 0-->not at all   PHQ-2 Total Score 0       Tobacco Use: Medium Risk    Smoking Tobacco Use: Former    Smokeless Tobacco Use: Former    Passive Exposure: Not on file      Objective   Vital Signs:   /67   Pulse 59   Temp 97.8 øF (36.6 øC)   Ht 167.6 cm (66\")   Wt 58.2 kg (128 lb 4.8 oz)   SpO2 98%   BMI 20.71 kg/mý       Current Outpatient Medications:     lisinopril-hydrochlorothiazide (PRINZIDE,ZESTORETIC) 20-12.5 MG per tablet, Take 1 tablet by mouth Daily., Disp: 90 tablet, Rfl: 3    metoprolol succinate XL (TOPROL-XL) 25 MG 24 hr tablet, Take 1 tablet by mouth Daily., Disp: 90 tablet, Rfl: 3    omeprazole (priLOSEC) 40 MG capsule, Take 1 capsule by mouth Daily for 14 days., Disp: 14 capsule, Rfl: 0   Past Medical History:   Diagnosis Date    Hypertension 10/10/2021    Mild mitral valve prolapse 10/7/2015    With mild mitral valve regurgitation on echo 2015    Stage 3a chronic kidney disease 10/10/2021      Physical Exam  Vitals reviewed.   Constitutional:       Appearance: Normal appearance. He is well-developed.   Neck:      Thyroid: No thyroid mass, thyromegaly or thyroid tenderness.   Cardiovascular:      " Rate and Rhythm: Normal rate and regular rhythm.      Heart sounds: No murmur heard.    No friction rub. No gallop.   Pulmonary:      Effort: Pulmonary effort is normal.      Breath sounds: Normal breath sounds. No wheezing or rhonchi.   Abdominal:      General: Bowel sounds are normal.      Palpations: Abdomen is soft.      Tenderness: There is no abdominal tenderness.   Lymphadenopathy:      Cervical: No cervical adenopathy.   Skin:     General: Skin is warm and dry.      Findings: No rash.   Neurological:      Mental Status: He is alert and oriented to person, place, and time.      Cranial Nerves: No cranial nerve deficit.   Psychiatric:         Mood and Affect: Mood and affect normal.         Behavior: Behavior normal.         Thought Content: Thought content normal. Thought content does not include homicidal or suicidal ideation.         Judgment: Judgment normal.      Result Review :   {The following data was reviewed by KAYLIN Mccarthy    No Images in the past 120 days found..    Common Labs   Common labs          7/18/2023    10:30   Common Labs   Glucose 87    BUN 17    Creatinine 1.21    Sodium 141    Potassium 5.1    Chloride 105    Calcium 9.6             Assessment and Plan    Diagnoses and all orders for this visit:    1. Right upper quadrant pain (Primary)    2. Gastroesophageal reflux disease, unspecified whether esophagitis present  -     omeprazole (priLOSEC) 40 MG capsule; Take 1 capsule by mouth Daily for 14 days.  Dispense: 14 capsule; Refill: 0    I will start him on Prilosec 40 mg daily for 14 days.  I advised him on GERD symptoms and will provide him diet information.  Advised him not to lay down for at least 2 hours after eating.  I will have him follow-up with me in 2 weeks to see how he is doing.  Advised if he is not doing better, we will do further work-up.    Follow Up   Return in about 2 weeks (around 8/25/2023) for Recheck GERD, RUQ pain.  Patient was given instructions and  counseling regarding his condition or for health maintenance advice. Please see specific information pulled into the AVS if appropriate.     Parts of this note are electronic transcriptions/translations of spoken language to printed text using the Dragon Dictation system.      Liliam Batres, KAYLIN  08/11/2023

## 2023-08-28 ENCOUNTER — OFFICE VISIT (OUTPATIENT)
Dept: FAMILY MEDICINE CLINIC | Facility: CLINIC | Age: 63
End: 2023-08-28
Payer: COMMERCIAL

## 2023-08-28 VITALS
HEART RATE: 57 BPM | WEIGHT: 132.7 LBS | DIASTOLIC BLOOD PRESSURE: 66 MMHG | SYSTOLIC BLOOD PRESSURE: 134 MMHG | TEMPERATURE: 97.4 F | BODY MASS INDEX: 21.33 KG/M2 | HEIGHT: 66 IN | OXYGEN SATURATION: 99 %

## 2023-08-28 DIAGNOSIS — Z23 NEED FOR FIRST BOOSTER DOSE OF COVID-19 VACCINE: ICD-10-CM

## 2023-08-28 DIAGNOSIS — K21.9 GASTROESOPHAGEAL REFLUX DISEASE, UNSPECIFIED WHETHER ESOPHAGITIS PRESENT: Primary | ICD-10-CM

## 2023-08-28 DIAGNOSIS — M54.2 NECK PAIN: ICD-10-CM

## 2023-08-28 DIAGNOSIS — M79.601 PAIN OF RIGHT UPPER EXTREMITY: ICD-10-CM

## 2023-08-28 PROBLEM — I51.9 HEART DISEASE: Status: ACTIVE | Noted: 2023-08-28

## 2023-08-28 PROBLEM — M19.90 ARTHRITIS: Status: ACTIVE | Noted: 2023-08-28

## 2023-08-28 PROBLEM — K62.5 RECTAL BLEEDING: Status: ACTIVE | Noted: 2023-08-28

## 2023-08-28 PROBLEM — K64.9 HEMORRHOIDS: Status: ACTIVE | Noted: 2023-08-28

## 2023-08-28 RX ORDER — FAMOTIDINE 40 MG/1
40 TABLET, FILM COATED ORAL NIGHTLY
Qty: 30 TABLET | Refills: 0 | Status: SHIPPED | OUTPATIENT
Start: 2023-08-28

## 2023-08-28 NOTE — ASSESSMENT & PLAN NOTE
GERD has improved after he finished 2 weeks of Prilosec.  He is still having some acid reflux.  I will start him on Pepcid 40 mg at night.  I also advised him on foods to avoid.  Handout on GERD provided, see AVS.

## 2023-08-28 NOTE — PROGRESS NOTES
"Chief Complaint  Heartburn    Subjective          Yoandy Rush, 63 y.o. male presents to [facility]  History of Present Illness   Patient presents today for 2-week follow-up on GERD and right upper quadrant pain.  I started him on Prilosec 40 mg for 2 weeks.  He states that his stomach does feel better but he does still sometimes have some acid reflux but thinks it may be due to some of the foods that he has been eating.    He is now complaining of right posterior shoulder pain, neck pain and sometimes down his arm.  He has not tried any over-the-counter medicine.  He states he has some Aleve at home and could try that.      Tobacco Use: Medium Risk    Smoking Tobacco Use: Former    Smokeless Tobacco Use: Former    Passive Exposure: Not on file      Objective   Vital Signs:   /66 (BP Location: Left arm, Patient Position: Sitting, Cuff Size: Adult)   Pulse 57   Temp 97.4 øF (36.3 øC)   Ht 167.6 cm (66\")   Wt 60.2 kg (132 lb 11.2 oz)   SpO2 99%   BMI 21.42 kg/mý       Current Outpatient Medications:     lisinopril-hydrochlorothiazide (PRINZIDE,ZESTORETIC) 20-12.5 MG per tablet, Take 1 tablet by mouth Daily., Disp: 90 tablet, Rfl: 3    metoprolol succinate XL (TOPROL-XL) 25 MG 24 hr tablet, Take 1 tablet by mouth Daily., Disp: 90 tablet, Rfl: 3    famotidine (Pepcid) 40 MG tablet, Take 1 tablet by mouth Every Night., Disp: 30 tablet, Rfl: 0   Past Medical History:   Diagnosis Date    Hypertension 10/10/2021    Mild mitral valve prolapse 10/7/2015    With mild mitral valve regurgitation on echo 2015    Stage 3a chronic kidney disease 10/10/2021      Physical Exam  Vitals reviewed.   Constitutional:       Appearance: Normal appearance. He is well-developed.   Neck:      Thyroid: No thyroid mass, thyromegaly or thyroid tenderness.   Cardiovascular:      Rate and Rhythm: Normal rate and regular rhythm.      Heart sounds: No murmur heard.    No friction rub. No gallop. "   Pulmonary:      Effort: Pulmonary effort is normal.      Breath sounds: Normal breath sounds. No wheezing or rhonchi.   Abdominal:      General: Bowel sounds are normal.      Palpations: Abdomen is soft.      Tenderness: There is no abdominal tenderness.   Musculoskeletal:      Right shoulder: No swelling or tenderness.      Cervical back: No spasms or tenderness.   Lymphadenopathy:      Cervical: No cervical adenopathy.   Skin:     General: Skin is warm and dry.   Neurological:      Mental Status: He is alert and oriented to person, place, and time.      Cranial Nerves: No cranial nerve deficit.   Psychiatric:         Mood and Affect: Mood and affect normal.         Behavior: Behavior normal.         Thought Content: Thought content normal. Thought content does not include homicidal or suicidal ideation.         Judgment: Judgment normal.             Assessment and Plan    Diagnoses and all orders for this visit:    1. Gastroesophageal reflux disease, unspecified whether esophagitis present (Primary)  Assessment & Plan:  GERD has improved after he finished 2 weeks of Prilosec.  He is still having some acid reflux.  I will start him on Pepcid 40 mg at night.  I also advised him on foods to avoid.  Handout on GERD provided, see AVS.    Orders:  -     famotidine (Pepcid) 40 MG tablet; Take 1 tablet by mouth Every Night.  Dispense: 30 tablet; Refill: 0    2. Neck pain  Assessment & Plan:  Recommended that he try Aleve for both his neck and shoulder/arm pain but advised him to make sure he takes it with food.  We also discussed changing his pillow.  Advised to follow-up if he does not get some improvement.      3. Pain of right upper extremity    4. Need for first booster dose of COVID-19 vaccine  -     COVID-19 (Pfizer) Bivalent 12+yrs        Follow Up   Return in about 2 weeks (around 9/11/2023) for Annual physical for Lise.  Patient was given instructions and counseling regarding his condition or for health  maintenance advice. Please see specific information pulled into the AVS if appropriate.     Parts of this note are electronic transcriptions/translations of spoken language to printed text using the Dragon Dictation system.      Liliam Batres, KAYLIN  08/28/2023

## 2023-08-28 NOTE — ASSESSMENT & PLAN NOTE
Recommended that he try Aleve for both his neck and shoulder/arm pain but advised him to make sure he takes it with food.  We also discussed changing his pillow.  Advised to follow-up if he does not get some improvement.

## 2023-09-11 ENCOUNTER — OFFICE VISIT (OUTPATIENT)
Dept: FAMILY MEDICINE CLINIC | Facility: CLINIC | Age: 63
End: 2023-09-11
Payer: COMMERCIAL

## 2023-09-11 VITALS
HEART RATE: 55 BPM | SYSTOLIC BLOOD PRESSURE: 100 MMHG | HEIGHT: 66 IN | WEIGHT: 131 LBS | DIASTOLIC BLOOD PRESSURE: 50 MMHG | TEMPERATURE: 97.7 F | RESPIRATION RATE: 16 BRPM | BODY MASS INDEX: 21.05 KG/M2 | OXYGEN SATURATION: 100 %

## 2023-09-11 DIAGNOSIS — Z23 NEED FOR TDAP VACCINATION: ICD-10-CM

## 2023-09-11 DIAGNOSIS — Z12.5 SCREENING PSA (PROSTATE SPECIFIC ANTIGEN): ICD-10-CM

## 2023-09-11 DIAGNOSIS — M25.511 ACUTE PAIN OF RIGHT SHOULDER: Primary | ICD-10-CM

## 2023-09-11 DIAGNOSIS — Z11.59 ENCOUNTER FOR HEPATITIS C SCREENING TEST FOR LOW RISK PATIENT: ICD-10-CM

## 2023-09-11 LAB
HCV AB SER DONR QL: NORMAL
PSA SERPL-MCNC: 1.7 NG/ML (ref 0–4)

## 2023-09-11 PROCEDURE — G0103 PSA SCREENING: HCPCS | Performed by: NURSE PRACTITIONER

## 2023-09-11 PROCEDURE — 86803 HEPATITIS C AB TEST: CPT | Performed by: NURSE PRACTITIONER

## 2023-09-11 NOTE — PROGRESS NOTES
"Chief Complaint  Arm Pain (Right arm pain, states feels like moves around), Shoulder Pain (Right arm states feels like it is arthritis), and Follow-up (States saw Liliam 2 weeks ago)    Subjective          Yoandy Rush presents to [facility]  History of Present Illness  His stomach is about the same but he is having the pain in the right shoulder.  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  He is able to move that he just has some stiffness.  He said the actual pain is gotten better since seeing Liliam.  He would be willing to get the x-rays just to look at the potential for arthritis.  He is followed by cardiology for his routine meds.    Depression: Not at risk    PHQ-2 Score: 0    and 2023    BMI is within normal parameters. No other follow-up for BMI required.         Allergies  Patient has no known allergies.    Social History     Tobacco Use    Smoking status: Former     Packs/day: 0.50     Years: 5.00     Pack years: 2.50     Types: Cigarettes     Quit date:      Years since quittin.7    Smokeless tobacco: Former   Vaping Use    Vaping Use: Never used   Substance Use Topics    Alcohol use: Yes     Alcohol/week: 2.0 standard drinks     Types: 2 Cans of beer per week     Comment: 1-2 per day    Drug use: Never       Family History   Problem Relation Age of Onset    Stroke Mother     Heart disease Father         Health Maintenance Due   Topic Date Due    HEPATITIS C SCREENING  Never done    ANNUAL PHYSICAL  Never done        Immunization History   Administered Date(s) Administered    COVID-19 (PFIZER) BIVALENT 12+YRS 2023    COVID-19 (PFIZER) Purple Cap Monovalent 2021, 2021    Tdap 2023       Review of Systems     Objective       Vitals:    23 1055   BP: 100/50   Pulse: 55   Resp: 16   Temp: 97.7 °F (36.5 °C)   SpO2: 100%   Weight: 59.4 kg (131 lb)   Height: 167.6 cm (66\") "       Body mass index is 21.14 kg/m².         Physical Exam        Result Review :     The following data was reviewed by: KAYLIN Quintanilla on 09/11/2023:    Common Labs   Common labs          7/18/2023    10:30   Common Labs   Glucose 87    BUN 17    Creatinine 1.21    Sodium 141    Potassium 5.1    Chloride 105    Calcium 9.6                     Assessment and Plan      Diagnoses and all orders for this visit:    1. Acute pain of right shoulder (Primary)  -     XR Spine Cervical Complete 4 or 5 View; Future  -     XR Shoulder 2+ View Right; Future    2. Need for Tdap vaccination  -     Tdap Vaccine Greater Than or Equal To 6yo IM    3. Encounter for hepatitis C screening test for low risk patient  -     Hepatitis C antibody    4. Screening PSA (prostate specific antigen)  -     PSA Screen            Follow Up     Return if symptoms worsen or fail to improve.  We will start with x-rays of the neck and shoulder and go from there.  We will do screening PSA and hep C screening today.  Continue seeing cardiology.  Patient was given instructions and counseling regarding his condition or for health maintenance advice. Please see specific information pulled into the AVS if appropriate.     Parts of this note are electronic transcriptions/translations of spoken language to printed text using the Dragon Dictation system.          KAYLIN Quintanilla  09/11/2023

## 2023-09-12 ENCOUNTER — TELEPHONE (OUTPATIENT)
Dept: FAMILY MEDICINE CLINIC | Facility: CLINIC | Age: 63
End: 2023-09-12

## 2023-09-12 NOTE — TELEPHONE ENCOUNTER
Caller: DEYANIRA SU    Relationship: Brother/Sister    Best call back number: [phone]    What was the call regarding: PATIENT'S BROTHER IS RETURNING CALL ABOUT RESULTS.

## 2023-09-14 ENCOUNTER — HOSPITAL ENCOUNTER (OUTPATIENT)
Dept: GENERAL RADIOLOGY | Facility: HOSPITAL | Age: 63
Discharge: HOME | End: 2023-09-14
Admitting: NURSE PRACTITIONER
Payer: COMMERCIAL

## 2023-09-14 DIAGNOSIS — M25.511 ACUTE PAIN OF RIGHT SHOULDER: ICD-10-CM

## 2023-09-14 PROCEDURE — 72050 X-RAY EXAM NECK SPINE 4/5VWS: CPT

## 2023-09-14 PROCEDURE — 73030 X-RAY EXAM OF SHOULDER: CPT

## 2024-05-02 DIAGNOSIS — I10 PRIMARY HYPERTENSION: Chronic | ICD-10-CM

## 2024-05-02 RX ORDER — LISINOPRIL AND HYDROCHLOROTHIAZIDE 20; 12.5 MG/1; MG/1
1 TABLET ORAL DAILY
Qty: 90 TABLET | Refills: 3 | Status: SHIPPED | OUTPATIENT
Start: 2024-05-02

## 2024-05-02 RX ORDER — METOPROLOL SUCCINATE 25 MG/1
25 TABLET, EXTENDED RELEASE ORAL DAILY
Qty: 90 TABLET | Refills: 3 | Status: SHIPPED | OUTPATIENT
Start: 2024-05-02

## 2024-05-07 ENCOUNTER — OFFICE VISIT (OUTPATIENT)
Dept: CARDIOLOGY | Facility: CLINIC | Age: 64
End: 2024-05-07
Payer: COMMERCIAL

## 2024-05-07 VITALS
HEIGHT: 66 IN | HEART RATE: 54 BPM | WEIGHT: 130 LBS | BODY MASS INDEX: 20.89 KG/M2 | DIASTOLIC BLOOD PRESSURE: 67 MMHG | SYSTOLIC BLOOD PRESSURE: 132 MMHG

## 2024-05-07 DIAGNOSIS — I10 PRIMARY HYPERTENSION: ICD-10-CM

## 2024-05-07 DIAGNOSIS — I34.1 MILD MITRAL VALVE PROLAPSE: Primary | Chronic | ICD-10-CM

## 2024-05-07 PROBLEM — M79.601 PAIN OF RIGHT UPPER EXTREMITY: Status: RESOLVED | Noted: 2023-08-28 | Resolved: 2024-05-07

## 2024-05-07 PROBLEM — M54.2 NECK PAIN: Status: RESOLVED | Noted: 2023-08-28 | Resolved: 2024-05-07

## 2024-05-07 PROBLEM — I51.9 HEART DISEASE: Status: RESOLVED | Noted: 2023-08-28 | Resolved: 2024-05-07

## 2024-05-07 PROBLEM — K62.5 RECTAL BLEEDING: Status: RESOLVED | Noted: 2023-08-28 | Resolved: 2024-05-07

## 2024-05-07 PROBLEM — R10.11 RIGHT UPPER QUADRANT PAIN: Status: RESOLVED | Noted: 2023-08-11 | Resolved: 2024-05-07

## 2024-05-07 PROCEDURE — 1159F MED LIST DOCD IN RCRD: CPT | Performed by: NURSE PRACTITIONER

## 2024-05-07 PROCEDURE — 3078F DIAST BP <80 MM HG: CPT | Performed by: NURSE PRACTITIONER

## 2024-05-07 PROCEDURE — 1160F RVW MEDS BY RX/DR IN RCRD: CPT | Performed by: NURSE PRACTITIONER

## 2024-05-07 PROCEDURE — 99213 OFFICE O/P EST LOW 20 MIN: CPT | Performed by: NURSE PRACTITIONER

## 2024-05-07 PROCEDURE — 3075F SYST BP GE 130 - 139MM HG: CPT | Performed by: NURSE PRACTITIONER

## 2024-06-18 ENCOUNTER — HOSPITAL ENCOUNTER (OUTPATIENT)
Dept: CARDIOLOGY | Facility: HOSPITAL | Age: 64
Discharge: HOME | End: 2024-06-18
Admitting: NURSE PRACTITIONER
Payer: COMMERCIAL

## 2024-06-18 DIAGNOSIS — I34.1 MILD MITRAL VALVE PROLAPSE: Chronic | ICD-10-CM

## 2024-06-18 PROCEDURE — 93306 TTE W/DOPPLER COMPLETE: CPT

## 2024-06-19 ENCOUNTER — TELEPHONE (OUTPATIENT)
Dept: CARDIOLOGY | Facility: CLINIC | Age: 64
End: 2024-06-19
Payer: COMMERCIAL

## 2024-06-19 NOTE — TELEPHONE ENCOUNTER
Caller: GEORGE SU    Relationship: Other Relative    Best call back number: [phone]    What is the best time to reach you: ANYTIME     Who are you requesting to speak with (clinical staff, provider,  specific staff member): ANYTIME     Do you know the name of the person who called: UNSURE     What was the call regarding: RESULTS OF ECHO MAYBE

## 2024-06-20 LAB
ASCENDING AORTA: 2.9 CM
BH CV ECHO MEAS - AO MAX PG: 9 MMHG
BH CV ECHO MEAS - AO MEAN PG: 5 MMHG
BH CV ECHO MEAS - AO ROOT DIAM: 3 CM
BH CV ECHO MEAS - AO V2 MAX: 150 CM/SEC
BH CV ECHO MEAS - AO V2 VTI: 36.7 CM
BH CV ECHO MEAS - AVA(I,D): 2.11 CM2
BH CV ECHO MEAS - EDV(CUBED): 119.8 ML
BH CV ECHO MEAS - EDV(MOD-SP2): 105 ML
BH CV ECHO MEAS - EDV(MOD-SP4): 108 ML
BH CV ECHO MEAS - EF(MOD-BP): 54.4 %
BH CV ECHO MEAS - EF(MOD-SP2): 52.1 %
BH CV ECHO MEAS - EF(MOD-SP4): 54.5 %
BH CV ECHO MEAS - ESV(CUBED): 31.6 ML
BH CV ECHO MEAS - ESV(MOD-SP2): 50.3 ML
BH CV ECHO MEAS - ESV(MOD-SP4): 49.1 ML
BH CV ECHO MEAS - FS: 35.9 %
BH CV ECHO MEAS - IVS/LVPW: 1.02 CM
BH CV ECHO MEAS - IVSD: 0.71 CM
BH CV ECHO MEAS - LA DIMENSION: 3.5 CM
BH CV ECHO MEAS - LAT PEAK E' VEL: 17.6 CM/SEC
BH CV ECHO MEAS - LV DIASTOLIC VOL/BSA (35-75): 64.8 CM2
BH CV ECHO MEAS - LV MASS(C)D: 112.6 GRAMS
BH CV ECHO MEAS - LV MAX PG: 5.4 MMHG
BH CV ECHO MEAS - LV MEAN PG: 2 MMHG
BH CV ECHO MEAS - LV SYSTOLIC VOL/BSA (12-30): 29.5 CM2
BH CV ECHO MEAS - LV V1 MAX: 116 CM/SEC
BH CV ECHO MEAS - LV V1 VTI: 24.7 CM
BH CV ECHO MEAS - LVIDD: 4.9 CM
BH CV ECHO MEAS - LVIDS: 3.2 CM
BH CV ECHO MEAS - LVOT AREA: 3.1 CM2
BH CV ECHO MEAS - LVOT DIAM: 2 CM
BH CV ECHO MEAS - LVPWD: 0.7 CM
BH CV ECHO MEAS - MED PEAK E' VEL: 10.7 CM/SEC
BH CV ECHO MEAS - MV A MAX VEL: 64 CM/SEC
BH CV ECHO MEAS - MV DEC SLOPE: 608 CM/SEC2
BH CV ECHO MEAS - MV DEC TIME: 0.17 SEC
BH CV ECHO MEAS - MV E MAX VEL: 104 CM/SEC
BH CV ECHO MEAS - MV E/A: 1.63
BH CV ECHO MEAS - MV MAX PG: 4 MMHG
BH CV ECHO MEAS - MV MEAN PG: 1 MMHG
BH CV ECHO MEAS - MV P1/2T: 49.6 MSEC
BH CV ECHO MEAS - MV V2 VTI: 31.8 CM
BH CV ECHO MEAS - MVA(P1/2T): 4.4 CM2
BH CV ECHO MEAS - MVA(VTI): 2.44 CM2
BH CV ECHO MEAS - RAP SYSTOLE: 3 MMHG
BH CV ECHO MEAS - RVDD: 2.7 CM
BH CV ECHO MEAS - RVSP: 27.6 MMHG
BH CV ECHO MEAS - SV(LVOT): 77.6 ML
BH CV ECHO MEAS - SV(MOD-SP2): 54.7 ML
BH CV ECHO MEAS - SV(MOD-SP4): 58.9 ML
BH CV ECHO MEAS - SVI(LVOT): 46.6 ML/M2
BH CV ECHO MEAS - SVI(MOD-SP2): 32.8 ML/M2
BH CV ECHO MEAS - SVI(MOD-SP4): 35.4 ML/M2
BH CV ECHO MEAS - TR MAX PG: 24.6 MMHG
BH CV ECHO MEAS - TR MAX VEL: 248 CM/SEC
BH CV ECHO MEASUREMENTS AVERAGE E/E' RATIO: 7.35
IVRT: 102 MS
LEFT ATRIUM VOLUME INDEX: 34.8 ML/M2

## 2024-06-21 ENCOUNTER — TELEPHONE (OUTPATIENT)
Dept: CARDIOLOGY | Facility: CLINIC | Age: 64
End: 2024-06-21
Payer: COMMERCIAL

## 2024-06-21 NOTE — TELEPHONE ENCOUNTER
----- Message from Tonja Hawk sent at 6/21/2024  8:14 AM EDT -----  Echocardiogram shows normal heart muscle function   There is mild mitral valve prolapse noted unchanged from prior echocardiogram, this will be monitored with repeat echo in 2-3 years. Follow up as scheduled

## 2024-12-26 NOTE — PROGRESS NOTES
Sandraef Complaint  Cyst (On back)    Subjective            Yoandy Rush is a 64 y.o. male who presents to [facility]   History of Present Illness  Acute visit, patient of KAYLIN Quintanilla.  He is accompanied by his brother Moe.  He is complaining of a knot on his back that has been irritated for the past week.  He states that the cyst has been on his back for a while but it recently got inflamed.  They state that he was able to squeeze out some thick white discharge from the area.    PHQ-2 Depression Screening  Little interest or pleasure in doing things? Not at all   Feeling down, depressed, or hopeless? Not at all   PHQ-2 Total Score 0           Tobacco Use: Medium Risk (12/27/2024)    Patient History     Smoking Tobacco Use: Former     Smokeless Tobacco Use: Former     Passive Exposure: Not on file      E-cigarette/Vaping    E-cigarette/Vaping Use Never User      E-cigarette/Vaping Substances     E-cigarette/Vaping Devices       Alcohol Use: Not on file         Objective   Vital Signs:   Vitals:    12/27/24 1440   BP: 130/52   Pulse: 64   Temp: 98.1 °F (36.7 °C)   SpO2: 100%   Weight: 57.7 kg (127 lb 3.2 oz)     Body mass index is 20.53 kg/m².    Wt Readings from Last 3 Encounters:   12/27/24 57.7 kg (127 lb 3.2 oz)   05/07/24 59 kg (130 lb)   09/11/23 59.4 kg (131 lb)     BP Readings from Last 3 Encounters:   12/27/24 130/52   05/07/24 132/67   09/11/23 100/50       Health Maintenance   Topic Date Due    ZOSTER VACCINE (1 of 2) Never done    ANNUAL PHYSICAL  Never done    INFLUENZA VACCINE  Never done    COVID-19 Vaccine (4 - 2024-25 season) 09/01/2024    COLORECTAL CANCER SCREENING  03/17/2025    TDAP/TD VACCINES (2 - Td or Tdap) 09/11/2033    HEPATITIS C SCREENING  Completed    Pneumococcal Vaccine 0-64  Aged Out       /52   Pulse 64   Temp 98.1 °F (36.7 °C)   Wt 57.7 kg (127 lb 3.2 oz)   SpO2 100%   BMI 20.53 kg/m²       Current Outpatient  Medications:     famotidine (Pepcid) 40 MG tablet, Take 1 tablet by mouth Every Night., Disp: 30 tablet, Rfl: 0    lisinopril-hydrochlorothiazide (PRINZIDE,ZESTORETIC) 20-12.5 MG per tablet, TAKE ONE TABLET BY MOUTH ONCE DAILY, Disp: 90 tablet, Rfl: 3    metoprolol succinate XL (TOPROL-XL) 25 MG 24 hr tablet, TAKE ONE TABLET BY MOUTH DAILY, Disp: 90 tablet, Rfl: 3    doxycycline (VIBRAMYCIN) 100 MG capsule, Take 1 capsule by mouth 2 (Two) Times a Day for 14 days. Indications: Infection of the Skin and/or Soft Tissue, Disp: 28 capsule, Rfl: 0   Past Medical History:   Diagnosis Date    Hypertension 10/10/2021    Mild mitral valve prolapse 10/07/2015    With mild mitral valve regurgitation on echo 2015    Rectal bleeding 08/28/2023    Stage 3a chronic kidney disease 10/10/2021        Physical Exam  Constitutional:       Appearance: Normal appearance.   Pulmonary:      Effort: Pulmonary effort is normal.   Skin:     Findings: Abscess present.          Neurological:      General: No focal deficit present.      Mental Status: He is alert and oriented to person, place, and time.   Psychiatric:         Mood and Affect: Mood normal.         Behavior: Behavior normal.     Right upper back:      Result Review :    The following data was reviewed by: KAYLIN Mccarthy on 12/27/2024:  Assessment & Plan  Sebaceous cyst  I considered prescribing him Bactrim but he has not had a renal profile in a while.  Small amount of thick white with some purulent drainage expressed from abscess, culture obtained.  Clean dry dressing applied.  I will prescribe him doxycycline twice daily.  Advised him to avoid being in the sun while on antibiotic, patient verbalized understanding.  Advised to call or follow-up if no improvement or any worsening of symptoms.  Orders:    doxycycline (VIBRAMYCIN) 100 MG capsule; Take 1 capsule by mouth 2 (Two) Times a Day for 14 days. Indications: Infection of the Skin and/or Soft Tissue    Wound Culture  - Wound, Back, Upper      BMI is within normal parameters. No other follow-up for BMI required.        Diagnosis Plan   1. Sebaceous cyst  doxycycline (VIBRAMYCIN) 100 MG capsule    Wound Culture - Wound, Back, Upper            FOLLOW UP  Return for Annual physical.  Patient was given instructions and counseling regarding his condition or for health maintenance advice. Please see specific information pulled into the AVS if appropriate.       CURRENT & DISCONTINUED MEDICATIONS  Current Outpatient Medications   Medication Instructions    doxycycline (VIBRAMYCIN) 100 mg, Oral, 2 Times Daily    famotidine (PEPCID) 40 mg, Oral, Nightly    lisinopril-hydrochlorothiazide (PRINZIDE,ZESTORETIC) 20-12.5 MG per tablet 1 tablet, Oral, Daily    metoprolol succinate XL (TOPROL-XL) 25 mg, Oral, Daily       There are no discontinued medications.     Parts of this note are electronic transcriptions/translations of spoken language to printed text using the Dragon Dictation system.    Liliam Batres, KAYLIN  12/27/24  15:51 EST

## 2024-12-27 ENCOUNTER — OFFICE VISIT (OUTPATIENT)
Dept: FAMILY MEDICINE CLINIC | Facility: CLINIC | Age: 64
End: 2024-12-27
Payer: COMMERCIAL

## 2024-12-27 VITALS
SYSTOLIC BLOOD PRESSURE: 130 MMHG | TEMPERATURE: 98.1 F | HEART RATE: 64 BPM | OXYGEN SATURATION: 100 % | DIASTOLIC BLOOD PRESSURE: 52 MMHG | BODY MASS INDEX: 20.53 KG/M2 | WEIGHT: 127.2 LBS

## 2024-12-27 DIAGNOSIS — L72.3 SEBACEOUS CYST: Primary | ICD-10-CM

## 2024-12-27 PROCEDURE — 87147 CULTURE TYPE IMMUNOLOGIC: CPT | Performed by: NURSE PRACTITIONER

## 2024-12-27 PROCEDURE — 1159F MED LIST DOCD IN RCRD: CPT | Performed by: NURSE PRACTITIONER

## 2024-12-27 PROCEDURE — 87186 SC STD MICRODIL/AGAR DIL: CPT | Performed by: NURSE PRACTITIONER

## 2024-12-27 PROCEDURE — 1160F RVW MEDS BY RX/DR IN RCRD: CPT | Performed by: NURSE PRACTITIONER

## 2024-12-27 PROCEDURE — 87070 CULTURE OTHR SPECIMN AEROBIC: CPT | Performed by: NURSE PRACTITIONER

## 2024-12-27 PROCEDURE — 87205 SMEAR GRAM STAIN: CPT | Performed by: NURSE PRACTITIONER

## 2024-12-27 PROCEDURE — 3078F DIAST BP <80 MM HG: CPT | Performed by: NURSE PRACTITIONER

## 2024-12-27 PROCEDURE — 3075F SYST BP GE 130 - 139MM HG: CPT | Performed by: NURSE PRACTITIONER

## 2024-12-27 PROCEDURE — 99213 OFFICE O/P EST LOW 20 MIN: CPT | Performed by: NURSE PRACTITIONER

## 2024-12-27 RX ORDER — DOXYCYCLINE 100 MG/1
100 CAPSULE ORAL 2 TIMES DAILY
Qty: 28 CAPSULE | Refills: 0 | Status: SHIPPED | OUTPATIENT
Start: 2024-12-27 | End: 2025-01-10

## 2024-12-30 LAB
BACTERIA SPEC AEROBE CULT: ABNORMAL
GRAM STN SPEC: ABNORMAL

## 2025-01-30 ENCOUNTER — OFFICE VISIT (OUTPATIENT)
Dept: FAMILY MEDICINE CLINIC | Facility: CLINIC | Age: 65
End: 2025-01-30
Payer: COMMERCIAL

## 2025-01-30 VITALS
HEIGHT: 66 IN | OXYGEN SATURATION: 100 % | DIASTOLIC BLOOD PRESSURE: 80 MMHG | WEIGHT: 129.5 LBS | SYSTOLIC BLOOD PRESSURE: 130 MMHG | RESPIRATION RATE: 16 BRPM | TEMPERATURE: 97.4 F | BODY MASS INDEX: 20.81 KG/M2 | HEART RATE: 56 BPM

## 2025-01-30 DIAGNOSIS — L72.3 SEBACEOUS CYST: ICD-10-CM

## 2025-01-30 DIAGNOSIS — Z00.00 ANNUAL PHYSICAL EXAM: Primary | ICD-10-CM

## 2025-01-30 DIAGNOSIS — Z12.5 SCREENING PSA (PROSTATE SPECIFIC ANTIGEN): ICD-10-CM

## 2025-01-30 DIAGNOSIS — Z12.11 SCREENING FOR COLON CANCER: ICD-10-CM

## 2025-01-30 DIAGNOSIS — Z23 NEEDS FLU SHOT: ICD-10-CM

## 2025-01-30 LAB
ALBUMIN SERPL-MCNC: 4.2 G/DL (ref 3.5–5.2)
ALBUMIN/GLOB SERPL: 1.3 G/DL
ALP SERPL-CCNC: 87 U/L (ref 39–117)
ALT SERPL W P-5'-P-CCNC: 23 U/L (ref 1–41)
ANION GAP SERPL CALCULATED.3IONS-SCNC: 11.8 MMOL/L (ref 5–15)
AST SERPL-CCNC: 35 U/L (ref 1–40)
BASOPHILS # BLD AUTO: 0.05 10*3/MM3 (ref 0–0.2)
BASOPHILS NFR BLD AUTO: 0.9 % (ref 0–1.5)
BILIRUB SERPL-MCNC: 0.6 MG/DL (ref 0–1.2)
BUN SERPL-MCNC: 25 MG/DL (ref 8–23)
BUN/CREAT SERPL: 18.2 (ref 7–25)
CALCIUM SPEC-SCNC: 9.8 MG/DL (ref 8.6–10.5)
CHLORIDE SERPL-SCNC: 97 MMOL/L (ref 98–107)
CHOLEST SERPL-MCNC: 168 MG/DL (ref 0–200)
CO2 SERPL-SCNC: 27.2 MMOL/L (ref 22–29)
CREAT SERPL-MCNC: 1.37 MG/DL (ref 0.76–1.27)
DEPRECATED RDW RBC AUTO: 38.7 FL (ref 37–54)
EGFRCR SERPLBLD CKD-EPI 2021: 58.3 ML/MIN/1.73
EOSINOPHIL # BLD AUTO: 0.22 10*3/MM3 (ref 0–0.4)
EOSINOPHIL NFR BLD AUTO: 4 % (ref 0.3–6.2)
ERYTHROCYTE [DISTWIDTH] IN BLOOD BY AUTOMATED COUNT: 11.9 % (ref 12.3–15.4)
GLOBULIN UR ELPH-MCNC: 3.3 GM/DL
GLUCOSE SERPL-MCNC: 95 MG/DL (ref 65–99)
HCT VFR BLD AUTO: 35.2 % (ref 37.5–51)
HDLC SERPL-MCNC: 77 MG/DL (ref 40–60)
HGB BLD-MCNC: 12.1 G/DL (ref 13–17.7)
IMM GRANULOCYTES # BLD AUTO: 0.02 10*3/MM3 (ref 0–0.05)
IMM GRANULOCYTES NFR BLD AUTO: 0.4 % (ref 0–0.5)
LDLC SERPL CALC-MCNC: 84 MG/DL (ref 0–100)
LDLC/HDLC SERPL: 1.1 {RATIO}
LYMPHOCYTES # BLD AUTO: 1.36 10*3/MM3 (ref 0.7–3.1)
LYMPHOCYTES NFR BLD AUTO: 24.6 % (ref 19.6–45.3)
MCH RBC QN AUTO: 30.3 PG (ref 26.6–33)
MCHC RBC AUTO-ENTMCNC: 34.4 G/DL (ref 31.5–35.7)
MCV RBC AUTO: 88.2 FL (ref 79–97)
MONOCYTES # BLD AUTO: 0.76 10*3/MM3 (ref 0.1–0.9)
MONOCYTES NFR BLD AUTO: 13.8 % (ref 5–12)
NEUTROPHILS NFR BLD AUTO: 3.11 10*3/MM3 (ref 1.7–7)
NEUTROPHILS NFR BLD AUTO: 56.3 % (ref 42.7–76)
NRBC BLD AUTO-RTO: 0 /100 WBC (ref 0–0.2)
PLATELET # BLD AUTO: 251 10*3/MM3 (ref 140–450)
PMV BLD AUTO: 10.5 FL (ref 6–12)
POTASSIUM SERPL-SCNC: 4 MMOL/L (ref 3.5–5.2)
PROT SERPL-MCNC: 7.5 G/DL (ref 6–8.5)
PSA SERPL-MCNC: 3.02 NG/ML (ref 0–4)
RBC # BLD AUTO: 3.99 10*6/MM3 (ref 4.14–5.8)
SODIUM SERPL-SCNC: 136 MMOL/L (ref 136–145)
TRIGL SERPL-MCNC: 31 MG/DL (ref 0–150)
TSH SERPL DL<=0.05 MIU/L-ACNC: 1.8 UIU/ML (ref 0.27–4.2)
VLDLC SERPL-MCNC: 7 MG/DL (ref 5–40)
WBC NRBC COR # BLD AUTO: 5.52 10*3/MM3 (ref 3.4–10.8)

## 2025-01-30 PROCEDURE — 84443 ASSAY THYROID STIM HORMONE: CPT | Performed by: NURSE PRACTITIONER

## 2025-01-30 PROCEDURE — 85025 COMPLETE CBC W/AUTO DIFF WBC: CPT | Performed by: NURSE PRACTITIONER

## 2025-01-30 PROCEDURE — 80061 LIPID PANEL: CPT | Performed by: NURSE PRACTITIONER

## 2025-01-30 PROCEDURE — G0103 PSA SCREENING: HCPCS | Performed by: NURSE PRACTITIONER

## 2025-01-30 PROCEDURE — 80053 COMPREHEN METABOLIC PANEL: CPT | Performed by: NURSE PRACTITIONER

## 2025-01-30 NOTE — PROGRESS NOTES
Chief Complaint  Annual Exam    Subjective          Yoandy Rush presents to [facility]  History of Present Illness  Here for check up:  Dentist: He has not been to the eye doctor in a very long time  Eye: He has not been to the dentist in a long time  PSA: Needs PSA today  Family hx: No changes noted  Colonoscopy: Says he will do a Cologuard but he has had the boxes before and so his brother Moe who is with him he does not turn about him.      Depression: Not at risk (2024)    PHQ-2     PHQ-2 Score: 0    and     BMI is within normal parameters. No other follow-up for BMI required.         Allergies  Patient has no known allergies.    Social History     Tobacco Use    Smoking status: Former     Current packs/day: 0.00     Average packs/day: 0.5 packs/day for 5.0 years (2.5 ttl pk-yrs)     Types: Cigarettes     Start date:      Quit date:      Years since quittin.0    Smokeless tobacco: Former   Vaping Use    Vaping status: Never Used   Substance Use Topics    Alcohol use: Yes     Alcohol/week: 2.0 standard drinks of alcohol     Types: 2 Cans of beer per week     Comment: 1-2 per day    Drug use: Never       Family History   Problem Relation Age of Onset    Stroke Mother     Heart disease Father         Health Maintenance Due   Topic Date Due    ZOSTER VACCINE (1 of 2) Never done        Immunization History   Administered Date(s) Administered    COVID-19 (PFIZER) BIVALENT 12+YRS 2023    COVID-19 (PFIZER) Purple Cap Monovalent 2021, 2021    Fluzone  >6mos 2025    Tdap 2023       Review of Systems   Constitutional:  Negative for fatigue.   Respiratory:  Negative for cough and shortness of breath.    Cardiovascular:  Negative for chest pain.   Gastrointestinal:  Negative for diarrhea, nausea and vomiting.        Objective       Vitals:    25 0926 25 0932   BP: 140/64 130/80   Pulse: 56    Resp: 16    Temp: 97.4 °F (36.3  "°C)    SpO2: 100%    Weight: 58.7 kg (129 lb 8 oz)    Height: 167.6 cm (66\")        Body mass index is 20.9 kg/m².         Physical Exam  Vitals reviewed.   Constitutional:       Appearance: Normal appearance. He is well-developed.   HENT:      Right Ear: Tympanic membrane and ear canal normal.      Left Ear: Tympanic membrane and ear canal normal.      Nose: No rhinorrhea.      Mouth/Throat:      Pharynx: No oropharyngeal exudate or posterior oropharyngeal erythema.      Comments: Decaying teeth noted  Cardiovascular:      Rate and Rhythm: Normal rate and regular rhythm.      Heart sounds: Normal heart sounds. No murmur heard.  Pulmonary:      Effort: Pulmonary effort is normal.      Breath sounds: Normal breath sounds.   Skin:     Comments: Dry patches noted on the face and on the hands  Cyst on the right shoulder still scabbed but not oozing.  There is a pin point black head noted.   Neurological:      Mental Status: He is alert and oriented to person, place, and time.      Cranial Nerves: No cranial nerve deficit.      Motor: No weakness.   Psychiatric:         Mood and Affect: Mood and affect normal.               Result Review :     The following data was reviewed by: KAYLIN Quintanilla on 01/30/2025:            No Images in the past 120 days found..              Assessment and Plan      Assessment & Plan  Needs flu shot    Orders:    Fluzone >6mos ([id number])    Comprehensive Metabolic Panel    CBC & Differential    TSH    Lipid Panel    Screening for colon cancer    Orders:    Cologuard - Stool, Per Rectum; Future    Annual physical exam  ADVICE WAS GIVEN RE:  ALCOHOL USE, SEATBELT USE, REGULAR EXERCISE, HEALTHY DIET, ROUTINE EYE AND DENTAL EXAMS         Sebaceous cyst  He saw Liliam for this and the cyst is on the right shoulder blade and it is still there though no redness noted.  Orders:    Ambulatory Referral to General Surgery    Screening PSA (prostate specific antigen)    Orders:    PSA " Screen       Diagnosis Plan   1. Annual physical exam        2. Needs flu shot  Fluzone >6mos ([id number])    Comprehensive Metabolic Panel    CBC & Differential    TSH    Lipid Panel      3. Screening for colon cancer  Cologuard - Stool, Per Rectum      4. Sebaceous cyst  Ambulatory Referral to General Surgery      5. Screening PSA (prostate specific antigen)  PSA Screen                Follow Up     Return in about 1 year (around 1/30/2026) for Annual physical.    Patient was given instructions and counseling regarding his condition or for health maintenance advice. Please see specific information pulled into the AVS if appropriate.     Parts of this note are electronic transcriptions/translations of spoken language to printed text using the Dragon Dictation system.          Lise Meneses, APRN  01/30/2025

## 2025-05-06 DIAGNOSIS — I10 PRIMARY HYPERTENSION: Chronic | ICD-10-CM

## 2025-05-07 ENCOUNTER — OFFICE VISIT (OUTPATIENT)
Dept: CARDIOLOGY | Facility: CLINIC | Age: 65
End: 2025-05-07
Payer: COMMERCIAL

## 2025-05-07 VITALS
DIASTOLIC BLOOD PRESSURE: 66 MMHG | SYSTOLIC BLOOD PRESSURE: 139 MMHG | BODY MASS INDEX: 20.3 KG/M2 | RESPIRATION RATE: 18 BRPM | HEART RATE: 79 BPM | WEIGHT: 125.8 LBS

## 2025-05-07 DIAGNOSIS — I34.1 MILD MITRAL VALVE PROLAPSE: Chronic | ICD-10-CM

## 2025-05-07 DIAGNOSIS — I10 PRIMARY HYPERTENSION: Primary | ICD-10-CM

## 2025-05-07 PROCEDURE — 99214 OFFICE O/P EST MOD 30 MIN: CPT | Performed by: INTERNAL MEDICINE

## 2025-05-07 PROCEDURE — 3078F DIAST BP <80 MM HG: CPT | Performed by: INTERNAL MEDICINE

## 2025-05-07 PROCEDURE — 3075F SYST BP GE 130 - 139MM HG: CPT | Performed by: INTERNAL MEDICINE

## 2025-05-07 RX ORDER — LISINOPRIL AND HYDROCHLOROTHIAZIDE 12.5; 2 MG/1; MG/1
1 TABLET ORAL DAILY
Qty: 90 TABLET | Refills: 3 | Status: SHIPPED | OUTPATIENT
Start: 2025-05-07

## 2025-05-07 RX ORDER — METOPROLOL SUCCINATE 25 MG/1
25 TABLET, EXTENDED RELEASE ORAL DAILY
Qty: 90 TABLET | Refills: 3 | Status: SHIPPED | OUTPATIENT
Start: 2025-05-07

## 2025-05-07 NOTE — ASSESSMENT & PLAN NOTE
With mild mitral valve prolapse but no regurgitation seen on last echocardiogram repeat in 3 years

## 2025-05-07 NOTE — ASSESSMENT & PLAN NOTE
Blood pressure remains controlled continue lisinopril HCTZ 20/12.5 mg daily dosing and Toprol 25 daily

## 2025-05-07 NOTE — PROGRESS NOTES
"Chief Complaint  Mild mitral valve prolapse (1yr f/u)    Subjective    Patient is doing well symptomatically denies any chest pain no change in breathing ability  Past Medical History:   Diagnosis Date    Hypertension 10/10/2021    Mild mitral valve prolapse 10/07/2015    With mild mitral valve regurgitation on echo     Rectal bleeding 2023    Stage 3a chronic kidney disease 10/10/2021         Current Outpatient Medications:     lisinopril-hydrochlorothiazide (PRINZIDE,ZESTORETIC) 20-12.5 MG per tablet, TAKE ONE TABLET BY MOUTH ONCE DAILY, Disp: 90 tablet, Rfl: 3    metoprolol succinate XL (TOPROL-XL) 25 MG 24 hr tablet, TAKE ONE TABLET BY MOUTH DAILY, Disp: 90 tablet, Rfl: 3    There are no discontinued medications.  No Known Allergies     Social History     Tobacco Use    Smoking status: Former     Current packs/day: 0.00     Average packs/day: 0.5 packs/day for 5.0 years (2.5 ttl pk-yrs)     Types: Cigarettes     Start date:      Quit date:      Years since quittin.3    Smokeless tobacco: Former   Vaping Use    Vaping status: Never Used   Substance Use Topics    Alcohol use: Yes     Alcohol/week: 2.0 standard drinks of alcohol     Types: 2 Cans of beer per week     Comment: 1-2 per day    Drug use: Never       Family History   Problem Relation Age of Onset    Stroke Mother     Heart disease Father         Objective     /66   Pulse 79   Resp 18   Wt 57.1 kg (125 lb 12.8 oz)   BMI 20.30 kg/m²       Physical Exam    General Appearance:   no acute distress  Alert and oriented x3  HENT:   lips not cyanotic  Atraumatic  Neck:  No jvd   supple  Respiratory:  no respiratory distress  normal breath sounds  no rales  Cardiovascular:  Regular rate and rhythm  no S3, no S4   no murmur  no rub  Extremities  No cyanosis  lower extremity edema: none    Skin:   warm, dry  No rashes      Result Review :     No results found for: \"PROBNP\"  CMP          2025    09:53   CMP   Glucose 95    BUN " "25    Creatinine 1.37    EGFR 58.3    Sodium 136    Potassium 4.0    Chloride 97    Calcium 9.8    Total Protein 7.5    Albumin 4.2    Globulin 3.3    Total Bilirubin 0.6    Alkaline Phosphatase 87    AST (SGOT) 35    ALT (SGPT) 23    Albumin/Globulin Ratio 1.3    BUN/Creatinine Ratio 18.2    Anion Gap 11.8      CBC w/diff          1/30/2025    09:53   CBC w/Diff   WBC 5.52    RBC 3.99    Hemoglobin 12.1    Hematocrit 35.2    MCV 88.2    MCH 30.3    MCHC 34.4    RDW 11.9    Platelets 251    Neutrophil Rel % 56.3    Immature Granulocyte Rel % 0.4    Lymphocyte Rel % 24.6    Monocyte Rel % 13.8    Eosinophil Rel % 4.0    Basophil Rel % 0.9       Lab Results   Component Value Date    TSH 1.800 01/30/2025      Lab Results   Component Value Date    FREET4 1.3 07/13/2020      No results found for: \"DDIMERQUANT\"  Magnesium   Date Value Ref Range Status   07/05/2022 1.9 1.6 - 2.4 mg/dL Final      No results found for: \"DIGOXIN\"   No results found for: \"TROPONINT\"        Lipid Panel          1/30/2025    09:53   Lipid Panel   Total Cholesterol 168    Triglycerides 31    HDL Cholesterol 77    VLDL Cholesterol 7    LDL Cholesterol  84    LDL/HDL Ratio 1.10      No results found for: \"POCTROP\"    Results for orders placed during the hospital encounter of 06/18/24    Adult Transthoracic Echo Complete W/ Cont if Necessary Per Protocol    Interpretation Summary    Left ventricular systolic function is normal. Calculated left ventricular EF = 54.4%    Left ventricular diastolic function was normal.    There is mild bileaflet mitral valve prolapse present.    Estimated right ventricular systolic pressure from tricuspid regurgitation is normal (<35 mmHg).   The 10-year ASCVD risk score (Agata DK, et al., 2019) is: 9.8%    Values used to calculate the score:      Age: 65 years      Sex: Male      Is Non- : No      Diabetic: No      Tobacco smoker: No      Systolic Blood Pressure: 139 mmHg      Is BP treated: " Yes      HDL Cholesterol: 77 mg/dL      Total Cholesterol: 168 mg/dL               Diagnoses and all orders for this visit:    1. Primary hypertension (Primary)  Assessment & Plan:  Blood pressure remains controlled continue lisinopril HCTZ 20/12.5 mg daily dosing and Toprol 25 daily       2. Mild mitral valve prolapse  Assessment & Plan:  With mild mitral valve prolapse but no regurgitation seen on last echocardiogram repeat in 3 years              Follow Up     Return in about 1 year (around 5/7/2026) for Follow with Tonja Hawk, EKG with F/U.          Patient was given instructions and counseling regarding his condition or for health maintenance advice. Please see specific information pulled into the AVS if appropriate.